# Patient Record
Sex: FEMALE | Race: WHITE | NOT HISPANIC OR LATINO | Employment: OTHER | URBAN - METROPOLITAN AREA
[De-identification: names, ages, dates, MRNs, and addresses within clinical notes are randomized per-mention and may not be internally consistent; named-entity substitution may affect disease eponyms.]

---

## 2020-11-12 ENCOUNTER — TRANSCRIBE ORDERS (OUTPATIENT)
Dept: ADMINISTRATIVE | Facility: HOSPITAL | Age: 80
End: 2020-11-12

## 2020-11-12 DIAGNOSIS — Z82.49 FAMILY HISTORY OF ABDOMINAL AORTIC ANEURYSM: Primary | ICD-10-CM

## 2020-11-19 ENCOUNTER — APPOINTMENT (OUTPATIENT)
Dept: ULTRASOUND IMAGING | Facility: HOSPITAL | Age: 80
End: 2020-11-19

## 2021-05-12 ENCOUNTER — OFFICE VISIT (OUTPATIENT)
Dept: NEUROLOGY | Facility: CLINIC | Age: 81
End: 2021-05-12

## 2021-05-12 VITALS
BODY MASS INDEX: 37.3 KG/M2 | HEIGHT: 60 IN | SYSTOLIC BLOOD PRESSURE: 124 MMHG | WEIGHT: 190 LBS | HEART RATE: 72 BPM | DIASTOLIC BLOOD PRESSURE: 80 MMHG | OXYGEN SATURATION: 90 % | TEMPERATURE: 96.9 F

## 2021-05-12 DIAGNOSIS — G47.19 EXCESSIVE DAYTIME SLEEPINESS: ICD-10-CM

## 2021-05-12 DIAGNOSIS — E53.8 VITAMIN B12 DEFICIENCY: ICD-10-CM

## 2021-05-12 DIAGNOSIS — R41.3 MEMORY LOSS: Primary | ICD-10-CM

## 2021-05-12 DIAGNOSIS — R29.6 FREQUENT FALLS: ICD-10-CM

## 2021-05-12 DIAGNOSIS — R41.0 DELIRIUM: ICD-10-CM

## 2021-05-12 PROBLEM — G89.29 CHRONIC BILATERAL LOW BACK PAIN WITH BILATERAL SCIATICA: Status: ACTIVE | Noted: 2021-05-12

## 2021-05-12 PROBLEM — E11.65 UNCONTROLLED TYPE 2 DIABETES MELLITUS WITH HYPERGLYCEMIA (HCC): Status: ACTIVE | Noted: 2021-05-12

## 2021-05-12 PROBLEM — I10 HYPERTENSION, BENIGN: Status: ACTIVE | Noted: 2021-05-12

## 2021-05-12 PROBLEM — Z79.4 TYPE 2 DIABETES MELLITUS WITH RETINOPATHY, WITH LONG-TERM CURRENT USE OF INSULIN (HCC): Status: ACTIVE | Noted: 2021-05-12

## 2021-05-12 PROBLEM — Z85.3 HISTORY OF BREAST CANCER: Status: ACTIVE | Noted: 2021-05-12

## 2021-05-12 PROBLEM — E11.319 TYPE 2 DIABETES MELLITUS WITH RETINOPATHY, WITH LONG-TERM CURRENT USE OF INSULIN (HCC): Status: ACTIVE | Noted: 2021-05-12

## 2021-05-12 PROBLEM — M54.42 CHRONIC BILATERAL LOW BACK PAIN WITH BILATERAL SCIATICA: Status: ACTIVE | Noted: 2021-05-12

## 2021-05-12 PROBLEM — M54.41 CHRONIC BILATERAL LOW BACK PAIN WITH BILATERAL SCIATICA: Status: ACTIVE | Noted: 2021-05-12

## 2021-05-12 PROBLEM — E78.2 MIXED HYPERLIPIDEMIA: Status: ACTIVE | Noted: 2021-05-12

## 2021-05-12 PROCEDURE — 99204 OFFICE O/P NEW MOD 45 MIN: CPT | Performed by: NURSE PRACTITIONER

## 2021-05-12 RX ORDER — PANTOPRAZOLE SODIUM 40 MG/1
TABLET, DELAYED RELEASE ORAL
COMMUNITY
Start: 2021-05-11

## 2021-05-12 RX ORDER — DIPHENHYDRAMINE HCL 25 MG
25 CAPSULE ORAL EVERY 6 HOURS PRN
COMMUNITY

## 2021-05-12 RX ORDER — ATORVASTATIN CALCIUM 80 MG/1
TABLET, FILM COATED ORAL
COMMUNITY
Start: 2021-05-11

## 2021-05-12 RX ORDER — BUPROPION HYDROCHLORIDE 150 MG/1
TABLET ORAL
COMMUNITY
Start: 2021-04-05

## 2021-05-12 RX ORDER — EXEMESTANE 25 MG/1
TABLET ORAL
COMMUNITY
Start: 2021-04-20 | End: 2021-11-19 | Stop reason: SDUPTHER

## 2021-05-12 RX ORDER — CLOPIDOGREL BISULFATE 75 MG/1
TABLET ORAL
COMMUNITY
Start: 2021-05-11 | End: 2021-11-19

## 2021-05-12 RX ORDER — ASPIRIN 81 MG/1
81 TABLET ORAL DAILY
COMMUNITY
End: 2021-11-19

## 2021-05-12 RX ORDER — SACUBITRIL AND VALSARTAN 24; 26 MG/1; MG/1
TABLET, FILM COATED ORAL
COMMUNITY
Start: 2021-05-11 | End: 2022-10-26

## 2021-05-12 RX ORDER — BLOOD SUGAR DIAGNOSTIC
STRIP MISCELLANEOUS
COMMUNITY
Start: 2021-05-08

## 2021-05-12 RX ORDER — METOPROLOL SUCCINATE 100 MG/1
TABLET, EXTENDED RELEASE ORAL
COMMUNITY
Start: 2021-05-11

## 2021-05-12 RX ORDER — INSULIN GLARGINE 300 U/ML
INJECTION, SOLUTION SUBCUTANEOUS
COMMUNITY
Start: 2021-04-05

## 2021-05-12 RX ORDER — EMPAGLIFLOZIN 10 MG/1
TABLET, FILM COATED ORAL
COMMUNITY
Start: 2021-05-08

## 2021-05-12 NOTE — PROGRESS NOTES
Subjective:     Patient ID: Urvashi Barrera is a 80 y.o. female.    CC:   Chief Complaint   Patient presents with   • Memory Loss       HPI:   History of Present Illness     Urvashi Barrera is a 80 y.o. female who comes to clinic today for evaluation of cognitive impairment. She has noted symptoms since at least Fall 2020 marked initially by forgetfulness , repetitiveness  and word-finding difficulties . This has gradually worsened  over time. Additional symptoms have included impairments in orientation , language  and short term memory. There have been associated  symptoms of anxiety , depression  and confusion intermittently with multiple hospitalizations. She denies  impairments in ADL's. Her family manages her medications and finances. She is no longer driving . She is currently residing at home with daughter and daughter's partner.     Moved from [place] where she was living in senior living alone. After multiple falls and an extensive hospitalization with poor diabetes control & rehab, she eventually was moved up to [place], [place] to live with daughter around October 2020. She was then diagnosed with an MI at [facility] 11/17/2021. She had rehab after this with PT/OT and had delirium. UTIs have been ruled out multiple times. She also had a Fall around Feb. 8th, 2021 and went to  ER and had Ct head and was reportedly normal. She was also admitted to  March 2021 for pancreatitis with gallstones and stent placement which has since been removed. Daughter feels like depression and anxiety have been increased and recently started Wellbutrin XL 150mg daily which has helped with anxiety, depression and less confusion. Has CKD Stage 3. Her daughter has noticed occasional hallucinations or delusions but this has typically been during illness. She completed high school and 1 year of college education. 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 She uses walker for ambulation. Has  some tremors in both hands present for several years and not resting or pill rolling. She does not sleep well and has excessive daytime sleepiness. Her daughter is concerned about Dementia. Has poor vision with left retinal tear and cataract surgery bilaterally. Wears glasses.    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 CTA head and neck 2/2021 showed no significant plaque. She is on aspirin 81mg daily, Plavix 75mg daily and Lipitor 80mg at night.    The following portions of the patient's history were reviewed and updated as appropriate: allergies, current medications, past family history, past medical history, past social history, past surgical history and problem list.    Past Medical History:   Diagnosis Date   • Anxiety    • Arthritis    • Cancer (CMS/HCC)    • Depression    • Diabetes mellitus (CMS/HCC)    • Heart disease    • Hyperlipidemia    • Hypertension        Past Surgical History:   Procedure Laterality Date   • BREAST LUMPECTOMY     • CATARACT EXTRACTION     • CORONARY STENT PLACEMENT     • GALLBLADDER SURGERY      placed and removed stent   • HYSTERECTOMY         Social History     Socioeconomic History   • Marital status: Unknown     Spouse name: Not on file   • Number of children: Not on file   • Years of education: Not on file   • Highest education level: Not on file   Tobacco Use   • Smoking status: Never Smoker   • Smokeless tobacco: Never Used   Vaping Use   • Vaping Use: Never used   Substance and Sexual Activity   • Alcohol use: Not Currently   • Drug use: Never   • Sexual activity: Not Currently       Family History   Problem Relation Age of Onset   • Hypertension Mother    • Stroke Mother    • Heart disease Father    • Hypertension Father         Review of Systems   Constitutional: Negative for chills, fatigue, fever and unexpected weight change.   HENT:  "Negative for ear pain, hearing loss, nosebleeds, rhinorrhea and sore throat.    Eyes: Negative for photophobia, pain, discharge, itching and visual disturbance.   Respiratory: Negative for cough, chest tightness, shortness of breath and wheezing.    Cardiovascular: Negative for chest pain, palpitations and leg swelling.   Gastrointestinal: Negative for abdominal pain, blood in stool, constipation, diarrhea, nausea and vomiting.   Genitourinary: Negative for dysuria, frequency, hematuria and urgency.   Musculoskeletal: Negative for arthralgias, back pain, gait problem, joint swelling, myalgias, neck pain and neck stiffness.   Skin: Negative for rash and wound.   Allergic/Immunologic: Negative for environmental allergies and food allergies.   Neurological: Negative for dizziness, tremors, seizures, syncope, speech difficulty, weakness, light-headedness, numbness and headaches.   Hematological: Negative for adenopathy. Does not bruise/bleed easily.   Psychiatric/Behavioral: Positive for agitation, confusion and decreased concentration. Negative for hallucinations, sleep disturbance and suicidal ideas. The patient is nervous/anxious.    All other systems reviewed and are negative.       Objective:  /80   Pulse 72   Temp 96.9 °F (36.1 °C)   Ht 152.4 cm (60\")   Wt 86.2 kg (190 lb)   SpO2 90%   BMI 37.11 kg/m²     Neurologic Exam     Mental Status   Oriented to person, place, and time.   Registration: recalls 3 of 3 objects. Recall at 5 minutes: recalls 3 of 3 objects. Follows 3 step commands.   Attention: normal. Concentration: normal.   Speech: speech is normal   Level of consciousness: alert  Knowledge: good and consistent with education. Able to perform simple calculations.   Able to name object. Able to read. Able to repeat. Able to write. Normal comprehension.     Cranial Nerves     CN II   Visual acuity: decreased    CN III, IV, VI   Pupils are equal, round, and reactive to light.  Extraocular motions " are normal.     CN V   Facial sensation intact.     CN VII   Facial expression full, symmetric.     CN VIII   CN VIII normal.     CN IX, X   CN IX normal.   CN X normal.     CN XI   CN XI normal.     CN XII   CN XII normal.     Motor Exam   Muscle bulk: normal  Overall muscle tone: normal    Strength   Strength 5/5 throughout.     Gait, Coordination, and Reflexes     Gait  Gait: wide-based (using walker, brisk gait)    Coordination   Romberg: negative  Finger to nose coordination: normal  Heel to shin coordination: normal    Tremor   Resting tremor: absent  Intention tremor: present (mild BUE kinetic hand tremor noted)  Action tremor: absent    Reflexes   Right brachioradialis: 2+  Left brachioradialis: 2+  Right biceps: 2+  Left biceps: 2+  Right patellar: 1+  Left patellar: 1+  Right achilles: 1+  Left achilles: 1+  Right : 2+  Left : 2+  Right plantar: normal  Left plantar: normal  Right ankle clonus: absent  Left ankle clonus: absent  Normal finger taps bilaterally       Physical Exam  Constitutional:       Appearance: Normal appearance.   Eyes:      Extraocular Movements: EOM normal.      Pupils: Pupils are equal, round, and reactive to light.   Cardiovascular:      Rate and Rhythm: Normal rate and regular rhythm.      Heart sounds: Normal heart sounds, S1 normal and S2 normal.   Pulmonary:      Effort: Pulmonary effort is normal.      Breath sounds: Normal breath sounds.   Neurological:      Mental Status: She is alert and oriented to person, place, and time.      Coordination: Finger-Nose-Finger Test, Heel to Shin Test and Romberg Test normal.      Deep Tendon Reflexes: Strength normal.      Reflex Scores:       Bicep reflexes are 2+ on the right side and 2+ on the left side.       Brachioradialis reflexes are 2+ on the right side and 2+ on the left side.       Patellar reflexes are 1+ on the right side and 1+ on the left side.       Achilles reflexes are 1+ on the right side and 1+ on the left  side.  Psychiatric:         Mood and Affect: Mood is anxious.         Speech: Speech normal.         Behavior: Behavior normal.         Thought Content: Thought content normal.         Cognition and Memory: She exhibits impaired recent memory (reported).         Judgment: Judgment normal.     MMSE 30/30 today    Assessment/Plan:       Diagnoses and all orders for this visit:    1. Memory loss (Primary)  Comments:  consider SLP-daughter will let us know.   Orders:  -     TSH; Future  -     T4, free; Future  -     Vitamin B12 & Folate; Future  -     Methylmalonic Acid, Serum; Future  -     Comprehensive Metabolic Panel; Future  -     CBC & Differential; Future  -     EEG Awake or Drowsy Routine; Future  -     T4, free  -     CBC & Differential  -     Comprehensive Metabolic Panel  -     Methylmalonic Acid, Serum  -     Vitamin B12 & Folate  -     TSH    2. Delirium  -     EEG Awake or Drowsy Routine; Future    3. Excessive daytime sleepiness  -     Ambulatory Referral to Sleep Medicine    4. Vitamin B12 deficiency  -     Vitamin B12 & Folate; Future  -     Methylmalonic Acid, Serum; Future  -     Methylmalonic Acid, Serum  -     Vitamin B12 & Folate    5. Frequent falls  Comments:  recently completed PT/OT. consider restarting.           We have discussed concerns of episodic delirium associated with hospitalizations and illnesses.  Also possible mild cognitive impairment however MMSE is a 30 out of 30 today.  I do suspect that anxiety and depression are playing a role as well as her adjusting to moving from [place] to [place].  She does have extensive chronic small vessel ischemic changes on the brain with extensive and advanced atrophy.  Also has noted old strokes.  She is currently on aspirin, Plavix and statin therapy.  Recommend working with PCP to get control of diabetes with recent hemoglobin A1c 7.6%.  She also has a known vitamin B12 deficiency from her prior home and was supposed to be on B12  injections.  We will check her labs today and order accordingly with PCP.  She may have some mild cognitive impairment.  We will continue to monitor her memory.  I did offer an MRI of the brain but they would like to wait with CT of the head done recently.  I also offered PT, OT and speech language pathology services and they will let us know if they are interested.  Also gave them information on memory care clinic and  resources.  Falls prevention discussed.  She is improving even in just 1 month on the Wellbutrin which I will feel she should continue for at least 3 months total with a 2 month follow-up in our clinic to see how she is doing.  Could consider cognitive enhancer at that time based on her symptoms.  We will monitor the tremor and episodic hallucinations and delusions which have been associated with illness and delirium.  With fluctuations we are going to get a baseline EEG.  We will also refer her to sleep medicine for sleep apnea evaluation.    Reviewed medications, potential side effects and signs and symptoms to report. Discussed risk versus benefits of treatment plan with patient and/or family-including medications, labs and radiology that may be ordered. Addressed questions and concerns during visit. Patient and/or family verbalized understanding and agree with plan.    AS THE PROVIDER, I PERSONALLY WORE PPE DURING ENTIRE FACE TO FACE ENCOUNTER IN CLINIC WITH THE PATIENT. PATIENT ALSO WORE PPE DURING ENTIRE FACE TO FACE ENCOUNTER EXCEPT FOR A MAX OF 30 SECONDS DURING NEUROLOGICAL EVALUATION OF CRANIAL NERVES AND THEN MASK WAS PLACED BACK OVER PATIENT FACE FOR REMAINDER OF VISIT. I WASHED MY HANDS BEFORE AND AFTER VISIT.    During this visit the following were done:  Labs Reviewed [x]  [facility] portal labs reviewed on daughter's cell phone today  Labs Ordered [x]    Radiology Reports Reviewed []    Radiology Ordered []    PCP Records Reviewed [x]    Referring Provider Records Reviewed [x]     ER Records Reviewed []    Hospital Records Reviewed [x]  [facility]ed [facility] and ct scans/cta scans on daughter's phone today from [facility]  History Obtained From Family [x]    Radiology Images Reviewed [x]    Other Reviewed []    Records Requested []      Shirley Billingsley, APRN  5/12/2021

## 2021-05-13 ENCOUNTER — TELEPHONE (OUTPATIENT)
Dept: NEUROLOGY | Facility: CLINIC | Age: 81
End: 2021-05-13

## 2021-05-13 DIAGNOSIS — R41.3 MEMORY LOSS: Primary | ICD-10-CM

## 2021-05-13 DIAGNOSIS — R29.6 FREQUENT FALLS: ICD-10-CM

## 2021-05-13 DIAGNOSIS — Z74.09 IMPAIRED FUNCTIONAL MOBILITY, BALANCE, GAIT, AND ENDURANCE: ICD-10-CM

## 2021-05-13 NOTE — TELEPHONE ENCOUNTER
Placed PT/OT/SLP orders with [facility]. Hopefully insurance will approve. Sent for [facility] or hospital based on their preference. We will continue to monitor cognition. I think a lot is related to her diabetes and frequent falls and hospitalizations. She felt better on wellbutrin xl and we may consider increasing this next visit. Thank you for this information. Please let daughter know I ordered these. Thanks, Shirley

## 2021-05-13 NOTE — TELEPHONE ENCOUNTER
"Phone call in from daughter, Gail, returning my call. She was glad I called as she wanted to provide updated information she couldn't discuss in visit. She said patient has had noticeable changes in home cleanliness, motivation issues since before moving to [place], stating anxiety/worry is baseline for her. She has improved since being with daughter possibly due to having diabetes better managed and around people but still very noticeable, also notes short term memory, very repetitious. She has a new tablet with games but will not initiate this. Says she feels like she has no purpose since daughter is grown, son . She has been short tempered \"lashing out\" at times, not sleeping well at night, sleeps too late and sleeps during day. We discussed ways to get her engaged thinking a /caregiver would be helpful. She has completed most parts of the Medicaid Waiver but needing assitance finding  for coordinating caregivers. I got her info on [facility] who can assist. We will talk with Shirley about restarting OT/PT and even SLP cog rehab. We will keep in touch as needed.    "

## 2021-05-14 NOTE — TELEPHONE ENCOUNTER
Daughter said she would like therapy through a group they have had previously, [facility]. Thank you!

## 2021-05-14 NOTE — TELEPHONE ENCOUNTER
I CALLED [facility] IN [place] AND SPOKE WITH THAO WHO REQUESTED I FAX THE LAST OFFICE NOTE AND REFERRAL AND THEY WILL CALL TO SCHEDULE.  FAXED

## 2021-05-18 ENCOUNTER — TELEPHONE (OUTPATIENT)
Dept: NEUROLOGY | Facility: CLINIC | Age: 81
End: 2021-05-18

## 2021-05-18 LAB
ALBUMIN SERPL-MCNC: 4.1 G/DL (ref 3.5–5.2)
ALBUMIN/GLOB SERPL: 1.6 G/DL
ALP SERPL-CCNC: 117 U/L (ref 39–117)
ALT SERPL-CCNC: 14 U/L (ref 1–33)
AST SERPL-CCNC: 17 U/L (ref 1–32)
BASOPHILS # BLD AUTO: 0.01 10*3/MM3 (ref 0–0.2)
BASOPHILS NFR BLD AUTO: 0.2 % (ref 0–1.5)
BILIRUB SERPL-MCNC: 0.4 MG/DL (ref 0–1.2)
BUN SERPL-MCNC: 24 MG/DL (ref 8–23)
BUN/CREAT SERPL: 20.7 (ref 7–25)
CALCIUM SERPL-MCNC: 9.8 MG/DL (ref 8.6–10.5)
CHLORIDE SERPL-SCNC: 104 MMOL/L (ref 98–107)
CO2 SERPL-SCNC: 23.6 MMOL/L (ref 22–29)
CREAT SERPL-MCNC: 1.16 MG/DL (ref 0.57–1)
EOSINOPHIL # BLD AUTO: 0.14 10*3/MM3 (ref 0–0.4)
EOSINOPHIL NFR BLD AUTO: 2.6 % (ref 0.3–6.2)
ERYTHROCYTE [DISTWIDTH] IN BLOOD BY AUTOMATED COUNT: 13.3 % (ref 12.3–15.4)
FOLATE SERPL-MCNC: 16.2 NG/ML (ref 4.78–24.2)
GLOBULIN SER CALC-MCNC: 2.5 GM/DL
GLUCOSE SERPL-MCNC: 164 MG/DL (ref 65–99)
HCT VFR BLD AUTO: 40.6 % (ref 34–46.6)
HGB BLD-MCNC: 13.2 G/DL (ref 12–15.9)
IMM GRANULOCYTES # BLD AUTO: 0.01 10*3/MM3 (ref 0–0.05)
IMM GRANULOCYTES NFR BLD AUTO: 0.2 % (ref 0–0.5)
LYMPHOCYTES # BLD AUTO: 1.52 10*3/MM3 (ref 0.7–3.1)
LYMPHOCYTES NFR BLD AUTO: 28.7 % (ref 19.6–45.3)
Lab: ABNORMAL
MCH RBC QN AUTO: 31.1 PG (ref 26.6–33)
MCHC RBC AUTO-ENTMCNC: 32.5 G/DL (ref 31.5–35.7)
MCV RBC AUTO: 95.8 FL (ref 79–97)
METHYLMALONATE SERPL-SCNC: 539 NMOL/L (ref 0–378)
MONOCYTES # BLD AUTO: 0.48 10*3/MM3 (ref 0.1–0.9)
MONOCYTES NFR BLD AUTO: 9.1 % (ref 5–12)
NEUTROPHILS # BLD AUTO: 3.14 10*3/MM3 (ref 1.7–7)
NEUTROPHILS NFR BLD AUTO: 59.2 % (ref 42.7–76)
NRBC BLD AUTO-RTO: 0 /100 WBC (ref 0–0.2)
PLATELET # BLD AUTO: 199 10*3/MM3 (ref 140–450)
POTASSIUM SERPL-SCNC: 3.9 MMOL/L (ref 3.5–5.2)
PROT SERPL-MCNC: 6.6 G/DL (ref 6–8.5)
RBC # BLD AUTO: 4.24 10*6/MM3 (ref 3.77–5.28)
SODIUM SERPL-SCNC: 141 MMOL/L (ref 136–145)
T4 FREE SERPL-MCNC: 1.18 NG/DL (ref 0.93–1.7)
TSH SERPL DL<=0.005 MIU/L-ACNC: 2.06 UIU/ML (ref 0.27–4.2)
VIT B12 SERPL-MCNC: 284 PG/ML (ref 211–946)
WBC # BLD AUTO: 5.3 10*3/MM3 (ref 3.4–10.8)

## 2021-05-18 NOTE — TELEPHONE ENCOUNTER
Provider: GERMAN ESPINOZA    Caller: BUDDY RAZA    Relationship to Patient: DAUGHTER    Pharmacy: NA    Phone Number: [phone]    Reason for Call: PATIENTS DAUGHTER IS ASKING FOR A CALL TO GO OVER LAB RESULTS. DAUGHTER ALSO SAID THAT DURING LAST VISIT, GERMAN READY THE CT SCANS AND MENTIONED A STROKE, SHE IS ASKING FOR AN MD TO READ THOSE RESULTS AS WELL AND GET BACK TO THEM ON THE RESULTS. PLEASE ADVISE.     When was the patient last seen: 5-12-21

## 2021-05-19 ENCOUNTER — TELEPHONE (OUTPATIENT)
Dept: NEUROLOGY | Facility: CLINIC | Age: 81
End: 2021-05-19

## 2021-05-19 DIAGNOSIS — E53.8 VITAMIN B12 DEFICIENCY: Primary | ICD-10-CM

## 2021-05-19 NOTE — TELEPHONE ENCOUNTER
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 CTA head and neck 2/2021 showed no significant plaque. As far as the scans go, an MD did read the scans-these scans were obtained from  records sent by the PCP and the Radiologist at  is always an MD and is the one who read the scans. I did not actually read the scans but simply restated what the MD Radiologist who viewed all of her CT head scans reported as his findings. I hope this gives her reassurance.    The labs were just resulted on 5/18/21 when I was out of the office. Blood counts and thyroid function look excellent. Liver function is normal. Her renal function which is the creatinine and BUN are slightly elevated which is very common in those over 65 years old. This is something to monitor closely with the PCP over time. Things that can worsen this include dehydration or not drinking enough fluids, some medications and ibuprofen.     Her vitamin B12 level is low and the more definitive test to check for a true deficiency is MMA or methylmalonic acid which is elevated indicating the patient has a true vitamin b12 deficiency. This can also negatively affect memory. I would like PCP office to be notified to set up vitamin b12 injections 1000mcg IM weekly x 4 weeks then monthly x 4 months and recheck B12 to ensure this is coming up. Sometimes after injections, patient can be transitioned to oral B12 replacement.     If she has any other questions. Please let me know. Thanks, LACY Zuleta

## 2021-05-19 NOTE — TELEPHONE ENCOUNTER
----- Message from Shannan Harden sent at 5/19/2021  9:34 AM EDT -----  [facility] called to inform SHAE Billingsley they have starteded pt on patient yesterday.  They will also do the other  speech therapy etc this week.  They will fax appropriate paperwork etc when prepared.

## 2021-05-19 NOTE — TELEPHONE ENCOUNTER
Ok. Noted. Please make sure you call daughter with my response below about ct scan and labs and fax and call pcp about vitamin b12 shots being recommended. Thank you.

## 2021-05-19 NOTE — TELEPHONE ENCOUNTER
PT AWARE OF THE RESULTS AND FAXED THE RESULTS TO PCP AND REQUESTED PCP CONTACT PT TO SCHEDULE PT B-12 INJECTIONS.

## 2021-05-19 NOTE — TELEPHONE ENCOUNTER
----- Message from LACY Carver sent at 5/19/2021  7:55 AM EDT -----  The labs were just resulted on 5/18/21 when I was out of the office. Blood counts and thyroid function look excellent. Liver function is normal. Her renal function which is the creatinine and BUN are slightly elevated which is very common in those over 65 years old. This is something to monitor closely with the PCP over time. Things that can worsen this include dehydration or not drinking enough fluids, some medications and ibuprofen.     Her vitamin B12 level is low and the more definitive test to check for a true deficiency is MMA or methylmalonic acid which is elevated indicating the patient has a true vitamin b12 deficiency. This can also negatively affect memory. I would like PCP office to be notified to set up vitamin b12 injections 1000mcg IM weekly x 4 weeks then monthly x 4 months and recheck B12 to ensure this is coming up. Sometimes after injections, patient can be transitioned to oral B12 replacement.     Please fax labs to PCP AND CALL them to see if they can start B12 injections as recommended above.    If she has any other questions. Please let me know. Thanks, LACY Zuleta

## 2021-05-19 NOTE — TELEPHONE ENCOUNTER
Note-For some reason this did not initially connect to the message sent below-this is a duplicate of what I have already sent. Please only call daughter once:    CT Head 2/2021 at  per radiology report shows extensive and advanced chronic microvascular ischemic infarcts with advanced atrophy and no acute intracranial abnormalities. CTA head and neck 2/2021 showed no significant plaque. As far as the scans go, an MD did read the scans-these scans were obtained from  records sent by the PCP and the Radiologist at  is always an MD and is the one who read the scans. I did not actually read the scans but simply restated what the MD Radiologist who viewed all of her CT head scans reported as his findings. I hope this gives her reassurance.     The labs were just resulted on 5/18/21 when I was out of the office. Blood counts and thyroid function look excellent. Liver function is normal. Her renal function which is the creatinine and BUN are slightly elevated which is very common in those over 65 years old. This is something to monitor closely with the PCP over time. Things that can worsen this include dehydration or not drinking enough fluids, some medications and ibuprofen.      Her vitamin B12 level is low and the more definitive test to check for a true deficiency is MMA or methylmalonic acid which is elevated indicating the patient has a true vitamin b12 deficiency. This can also negatively affect memory. I would like PCP office to be notified to set up vitamin b12 injections 1000mcg IM weekly x 4 weeks then monthly x 4 months and recheck B12 to ensure this is coming up. Sometimes after injections, patient can be transitioned to oral B12 replacement.      If she has any other questions. Please let me know. Thanks, LACY Zuleta

## 2021-05-26 RX ORDER — CYANOCOBALAMIN 1000 UG/ML
INJECTION, SOLUTION INTRAMUSCULAR; SUBCUTANEOUS
Qty: 10 ML | Refills: 0 | Status: SHIPPED | OUTPATIENT
Start: 2021-05-26 | End: 2021-11-19

## 2021-05-27 ENCOUNTER — PRIOR AUTHORIZATION (OUTPATIENT)
Dept: NEUROLOGY | Facility: CLINIC | Age: 81
End: 2021-05-27

## 2021-05-27 NOTE — TELEPHONE ENCOUNTER
PA FOR CYANOCOBALAMIN INJECTIONS THROUGH COVER MY MEDS, KEY IS RAHUL, IT WAS DENIED.  WAITING ON DENIAL LETTER TO APPEAL.

## 2021-06-04 ENCOUNTER — TELEPHONE (OUTPATIENT)
Dept: NEUROLOGY | Facility: CLINIC | Age: 81
End: 2021-06-04

## 2021-06-04 NOTE — TELEPHONE ENCOUNTER
ISAMAR FROM [facility] CALLED IN AND STATED THAT THE PATIENTS SWALLOWING WAS IN NORMAL RANGE. I EXPLAINED THAT THE REFERRAL WAS FOR COGNATIVE EVAL. SO I ENDED UP GIVING HER A VERBAL ORDER FOR HER TO GO BACK OUT TO THE PATIENT. THE DAUGHTER HAD TOLD HER ABOUT THE PATIENT HAVING TROUBLE SWALLOWING.

## 2021-06-04 NOTE — TELEPHONE ENCOUNTER
Unfortunately medicare part D refuses to approve vitamin B12 injections. Unfortunately there is no way to get these approved. I would recommend her daughter obtain over the counter vitamin b12 500mcg to 1000mcg daily in liquid or oral form to give her. Thanks, LACY Zuleta

## 2021-06-11 ENCOUNTER — TELEPHONE (OUTPATIENT)
Dept: NEUROLOGY | Facility: CLINIC | Age: 81
End: 2021-06-11

## 2021-06-11 NOTE — TELEPHONE ENCOUNTER
[facility], Lety Stephenson, called and said pt missed her visit graeme 6- due to feeling ill and she wanted to get a verbal to reschedule pt for future visits and I gave the verbal as well.

## 2021-06-14 ENCOUNTER — TELEPHONE (OUTPATIENT)
Dept: NEUROLOGY | Facility: CLINIC | Age: 81
End: 2021-06-14

## 2021-06-14 NOTE — TELEPHONE ENCOUNTER
----- Message from Shannan Harden sent at 6/14/2021 10:18 AM EDT -----  Regarding: PHYSICAL THERAPY  CHUY NEWSOME, PHYSICAL THERAPIST, CALLED.  SHE IS WANTING A VERBAL FOR ADDITIONAL PHYSICAL THERAPY FOR PATIENT.  SHE IS HAVING WORSENING PAIN WITH HER SCIATICA WHICH HAS WORSENED THIS PAST WEEK.  HER PHONE -[phone].

## 2021-06-16 NOTE — TELEPHONE ENCOUNTER
Reina called in and stated that she would like to see the patient this week she is having issues with her sciatica and some falls. I gave her a verbal for PT

## 2021-06-23 ENCOUNTER — TRANSCRIBE ORDERS (OUTPATIENT)
Dept: ADMINISTRATIVE | Facility: HOSPITAL | Age: 81
End: 2021-06-23

## 2021-06-23 DIAGNOSIS — M54.42 LOW BACK PAIN WITH LEFT-SIDED SCIATICA, UNSPECIFIED BACK PAIN LATERALITY, UNSPECIFIED CHRONICITY: Primary | ICD-10-CM

## 2021-07-01 ENCOUNTER — TELEMEDICINE (OUTPATIENT)
Dept: SLEEP MEDICINE | Facility: HOSPITAL | Age: 81
End: 2021-07-01

## 2021-07-01 DIAGNOSIS — R06.83 SNORING: Primary | ICD-10-CM

## 2021-07-01 DIAGNOSIS — G47.61 PLMD (PERIODIC LIMB MOVEMENT DISORDER): ICD-10-CM

## 2021-07-01 DIAGNOSIS — G47.33 OBSTRUCTIVE SLEEP APNEA, ADULT: ICD-10-CM

## 2021-07-01 DIAGNOSIS — G47.21 CIRCADIAN RHYTHM SLEEP DISORDER, DELAYED SLEEP PHASE TYPE: ICD-10-CM

## 2021-07-01 DIAGNOSIS — E66.01 CLASS 2 SEVERE OBESITY DUE TO EXCESS CALORIES WITH SERIOUS COMORBIDITY AND BODY MASS INDEX (BMI) OF 35.0 TO 35.9 IN ADULT (HCC): ICD-10-CM

## 2021-07-01 PROCEDURE — 99203 OFFICE O/P NEW LOW 30 MIN: CPT | Performed by: INTERNAL MEDICINE

## 2021-07-01 NOTE — PATIENT INSTRUCTIONS
Restless Legs Syndrome  Restless legs syndrome is a condition that causes uncomfortable feelings or sensations in the legs, especially while sitting or lying down. The sensations usually cause an overwhelming urge to move the legs. The arms can also sometimes be affected.  The condition can range from mild to severe. The symptoms often interfere with a person's ability to sleep.  What are the causes?  The cause of this condition is not known.  What increases the risk?  The following factors may make you more likely to develop this condition:  · Being older than 50.  · Pregnancy.  · Being a woman. In general, the condition is more common in women than in men.  · A family history of the condition.  · Having iron deficiency.  · Overuse of caffeine, nicotine, or alcohol.  · Certain medical conditions, such as kidney disease, Parkinson's disease, or nerve damage.  · Certain medicines, such as those for high blood pressure, nausea, colds, allergies, depression, and some heart conditions.  What are the signs or symptoms?  The main symptom of this condition is uncomfortable sensations in the legs, such as:  · Pulling.  · Tingling.  · Prickling.  · Throbbing.  · Crawling.  · Burning.  Usually, the sensations:  · Affect both sides of the body.  · Are worse when you sit or lie down.  · Are worse at night. These may wake you up or make it difficult to fall asleep.  · Make you have a strong urge to move your legs.  · Are temporarily relieved by moving your legs.  The arms can also be affected, but this is rare. People who have this condition often have tiredness during the day because of their lack of sleep at night.  How is this diagnosed?  This condition may be diagnosed based on:  · Your symptoms.  · Blood tests.  In some cases, you may be monitored in a sleep lab by a specialist (a sleep study). This can detect any disruptions in your sleep.  How is this treated?  This condition is treated by managing the symptoms. This may  include:  · Lifestyle changes, such as exercising, using relaxation techniques, and avoiding caffeine, alcohol, or tobacco.  · Medicines. Anti-seizure medicines may be tried first.  Follow these instructions at home:  General instructions  · Take over-the-counter and prescription medicines only as told by your health care provider.  · Use methods to help relieve the uncomfortable sensations, such as:  ? Massaging your legs.  ? Walking or stretching.  ? Taking a cold or hot bath.  · Keep all follow-up visits as told by your health care provider. This is important.  Lifestyle         · Practice good sleep habits. For example, go to bed and get up at the same time every day. Most adults should get 7-9 hours of sleep each night.  · Exercise regularly. Try to get at least 30 minutes of exercise most days of the week.  · Practice ways of relaxing, such as yoga or meditation.  · Avoid caffeine and alcohol.  · Do not use any products that contain nicotine or tobacco, such as cigarettes and e-cigarettes. If you need help quitting, ask your health care provider.  Contact a health care provider if:  · Your symptoms get worse or they do not improve with treatment.  Summary  · Restless legs syndrome is a condition that causes uncomfortable feelings or sensations in the legs, especially while sitting or lying down.  · The symptoms often interfere with a person's ability to sleep.  · This condition is treated by managing the symptoms. You may need to make lifestyle changes or take medicines.  This information is not intended to replace advice given to you by your health care provider. Make sure you discuss any questions you have with your health care provider.  Document Revised: 02/05/2021 Document Reviewed: 01/07/2019  Elsevier Patient Education © 2021 [employer]  Sleep Apnea  Sleep apnea is a condition in which breathing pauses or becomes shallow during sleep. Episodes of sleep apnea usually last 10 seconds or longer, and  they may occur as many as 20 times an hour. Sleep apnea disrupts your sleep and keeps your body from getting the rest that it needs. This condition can increase your risk of certain health problems, including:  · Heart attack.  · Stroke.  · Obesity.  · Diabetes.  · Heart failure.  · Irregular heartbeat.  What are the causes?  There are three kinds of sleep apnea:  · Obstructive sleep apnea. This kind is caused by a blocked or collapsed airway.  · Central sleep apnea. This kind happens when the part of the brain that controls breathing does not send the correct signals to the muscles that control breathing.  · Mixed sleep apnea. This is a combination of obstructive and central sleep apnea.  The most common cause of this condition is a collapsed or blocked airway. An airway can collapse or become blocked if:  · Your throat muscles are abnormally relaxed.  · Your tongue and tonsils are larger than normal.  · You are overweight.  · Your airway is smaller than normal.  What increases the risk?  You are more likely to develop this condition if you:  · Are overweight.  · Smoke.  · Have a smaller than normal airway.  · Are elderly.  · Are male.  · Drink alcohol.  · Take sedatives or tranquilizers.  · Have a family history of sleep apnea.  What are the signs or symptoms?  Symptoms of this condition include:  · Trouble staying asleep.  · Daytime sleepiness and tiredness.  · Irritability.  · Loud snoring.  · Morning headaches.  · Trouble concentrating.  · Forgetfulness.  · Decreased interest in sex.  · Unexplained sleepiness.  · Mood swings.  · Personality changes.  · Feelings of depression.  · Waking up often during the night to urinate.  · Dry mouth.  · Sore throat.  How is this diagnosed?  This condition may be diagnosed with:  · A medical history.  · A physical exam.  · A series of tests that are done while you are sleeping (sleep study). These tests are usually done in a sleep lab, but they may also be done at home.  How  is this treated?  Treatment for this condition aims to restore normal breathing and to ease symptoms during sleep. It may involve managing health issues that can affect breathing, such as high blood pressure or obesity. Treatment may include:  · Sleeping on your side.  · Using a decongestant if you have nasal congestion.  · Avoiding the use of depressants, including alcohol, sedatives, and narcotics.  · Losing weight if you are overweight.  · Making changes to your diet.  · Quitting smoking.  · Using a device to open your airway while you sleep, such as:  ? An oral appliance. This is a custom-made mouthpiece that shifts your lower jaw forward.  ? A continuous positive airway pressure (CPAP) device. This device blows air through a mask when you breathe out (exhale).  ? A nasal expiratory positive airway pressure (EPAP) device. This device has valves that you put into each nostril.  ? A bi-level positive airway pressure (BPAP) device. This device blows air through a mask when you breathe in (inhale) and breathe out (exhale).  · Having surgery if other treatments do not work. During surgery, excess tissue is removed to create a wider airway.  It is important to get treatment for sleep apnea. Without treatment, this condition can lead to:  · High blood pressure.  · Coronary artery disease.  · In men, an inability to achieve or maintain an erection (impotence).  · Reduced thinking abilities.  Follow these instructions at home:  Lifestyle  · Make any lifestyle changes that your health care provider recommends.  · Eat a healthy, well-balanced diet.  · Take steps to lose weight if you are overweight.  · Avoid using depressants, including alcohol, sedatives, and narcotics.  · Do not use any products that contain nicotine or tobacco, such as cigarettes, e-cigarettes, and chewing tobacco. If you need help quitting, ask your health care provider.  General instructions  · Take over-the-counter and prescription medicines only as  told by your health care provider.  · If you were given a device to open your airway while you sleep, use it only as told by your health care provider.  · If you are having surgery, make sure to tell your health care provider you have sleep apnea. You may need to bring your device with you.  · Keep all follow-up visits as told by your health care provider. This is important.  Contact a health care provider if:  · The device that you received to open your airway during sleep is uncomfortable or does not seem to be working.  · Your symptoms do not improve.  · Your symptoms get worse.  Get help right away if:  · You develop:  ? Chest pain.  ? Shortness of breath.  ? Discomfort in your back, arms, or stomach.  · You have:  ? Trouble speaking.  ? Weakness on one side of your body.  ? Drooping in your face.  These symptoms may represent a serious problem that is an emergency. Do not wait to see if the symptoms will go away. Get medical help right away. Call your local emergency services (911 in the [place] Do not drive yourself to the hospital.  Summary  · Sleep apnea is a condition in which breathing pauses or becomes shallow during sleep.  · The most common cause is a collapsed or blocked airway.  · The goal of treatment is to restore normal breathing and to ease symptoms during sleep.  This information is not intended to replace advice given to you by your health care provider. Make sure you discuss any questions you have with your health care provider.  Document Revised: 06/03/2020 Document Reviewed: 08/13/2019  [employer] Patient Education © 2021 [employer].

## 2021-07-02 NOTE — PROGRESS NOTES
"Subjective   Urvashi Barrera is a 80 y.o. female is being seen for consultation today at the request of ROVERTO Carver for the evaluation of excessive sleepiness.  Patient's primary care provider is LACY Skelton  You have chosen to receive care through a telehealth visit.  Do you consent to use a video/audio connection for your medical care today? Yes    History of Present Illness  When initially questioned the patient says she \"sleeps okay\".  On coaxing from her caregiver and her daughter she apparently has difficulty sleeping at night and then sleeps a lot during the day.  She admits she has had snoring noted all of her life.  She apparently has been noted to have some apneas recently.  She denies awakening gasping for breath.  She admits she is not rested on arising in the morning.  She denies morning headaches.    She will awaken with a dry mouth or sore throat.  She denies ever breaking her nose.  She has awakened due to leg kicks.  She is currently on gabapentin for back pain.  She has a history of coronary artery disease and had an MI in October.  She had breast surgery for cancer in 2005.    She apparently gets in bed at 9 and 10 PM but watches TV and then does not usually go to sleep until about 2 AM.  She will awaken 2-3 times during the night.  She often sleeps till 930 or 10 in the morning.  She gets about 8 hours of sleep but is not rested.  She has had hypertension known for about 4 years and diabetes known for 4 years.  She has been known to have coronary artery disease only since last fall.  She has had problems recently with gallstone pancreatitis and had to have it by biliary stents.  She continues to complain of a lot of back pain.  No Known Allergies       Current Outpatient Medications:   •  Accu-Chek Brit Plus test strip, , Disp: , Rfl:   •  aspirin (aspirin) 81 MG EC tablet, Take 81 mg by mouth Daily., Disp: , Rfl:   •  atorvastatin (LIPITOR) 80 MG tablet, , Disp: , Rfl:   •  " "buPROPion XL (WELLBUTRIN XL) 150 MG 24 hr tablet, , Disp: , Rfl:   •  clopidogrel (PLAVIX) 75 MG tablet, , Disp: , Rfl:   •  cyanocobalamin 1000 MCG/ML injection, Inject 1 ML IM weekly x 4 weeks then 1 ML IM monthly x 3 months, Disp: 10 mL, Rfl: 0  •  diphenhydrAMINE (BENADRYL) 25 mg capsule, Take 25 mg by mouth Every 6 (Six) Hours As Needed for Itching., Disp: , Rfl:   •  Entresto 24-26 MG tablet, , Disp: , Rfl:   •  exemestane (AROMASIN) 25 MG chemo tablet, , Disp: , Rfl:   •  Jardiance 10 MG tablet, , Disp: , Rfl:   •  metoprolol succinate XL (TOPROL-XL) 100 MG 24 hr tablet, , Disp: , Rfl:   •  pantoprazole (PROTONIX) 40 MG EC tablet, , Disp: , Rfl:   •  Syringe/Needle, Disp, 25G X 1-1/4\" 3 ML misc, Use for vitamin b12 injections, Disp: 10 each, Rfl: 0  •  Toujeo Max SoloStar 300 UNIT/ML solution pen-injector injection, , Disp: , Rfl:     Social History    Tobacco Use      Smoking status: Never Smoker      Smokeless tobacco: Never Used       Social History     Substance and Sexual Activity   Alcohol Use Not Currently       Caffeine: She admits to having 1 cup of coffee per day and may have up to 4 sodas per week    Past Medical History:   Diagnosis Date   • Anxiety    • Arthritis    • Cancer (CMS/HCC)    • Depression    • Diabetes mellitus (CMS/HCC)    • Heart disease    • Hyperlipidemia    • Hypertension        Past Surgical History:   Procedure Laterality Date   • BREAST LUMPECTOMY     • CATARACT EXTRACTION     • CORONARY STENT PLACEMENT     • GALLBLADDER SURGERY      placed and removed stent   • HYSTERECTOMY         Family History   Problem Relation Age of Onset   • Hypertension Mother    • Stroke Mother    • Heart disease Father    • Hypertension Father        The following portions of the patient's history were reviewed and updated as appropriate: allergies, current medications, past family history, past medical history, past social history, past surgical history and problem list.    Review of Systems "   Constitutional: Positive for fatigue and unexpected weight change.   HENT: Positive for congestion, postnasal drip and sinus pressure.    Eyes: Negative.    Respiratory: Positive for cough.    Cardiovascular: Negative.    Gastrointestinal: Positive for constipation.   Endocrine: Negative.    Genitourinary: Positive for frequency.   Musculoskeletal: Positive for arthralgias, back pain and joint swelling.   Skin: Negative.    Allergic/Immunologic: Positive for environmental allergies.   Neurological: Negative.    Hematological: Negative.    Psychiatric/Behavioral: Positive for dysphoric mood. The patient is nervous/anxious.    Mayetta score is 10/24    Objective     There were no vitals taken for this visit.     Physical Exam  The patient appears awake and fairly alert.  She does not appear to be in acute respiratory distress.  Her stated weight is 186 pounds.  Her height is 5 feet.  Her body mass index is 35.    Assessment/Plan   Diagnoses and all orders for this visit:    1. Snoring (Primary)    2. Obstructive sleep apnea, adult    3. PLMD (periodic limb movement disorder)    4. Circadian rhythm sleep disorder, delayed sleep phase type    5. Class 2 severe obesity due to excess calories with serious comorbidity and body mass index (BMI) of 35.0 to 35.9 in adult (CMS/[facility])    Patient is a rather tangential historian and at first denies having any sleep issues.  She does have a history of snoring and may well have obstructive sleep apnea.  She has what sounds to be periodic limb movement disorder that may be disrupting to her sleep.  Her chronic pain is also probably disruptive to her sleep.  She furthermore has delayed sleep phase syndrome and does not really try going to sleep until 2 AM and then sleeps until about 10 AM.  We discussed possible evaluation for obstructive sleep apnea.  With her current sleep schedule, it would probably require a home sleep test because she probably would not sleep long enough in the  sleep lab to have useful information.  We have discussed possible treatment of obstructive sleep apnea including CPAP, weight control, oral appliance, and surgery.  We have also discussed the long-term consequences of untreated obstructive sleep apnea including hypertension, diabetes, heart disease, stroke, and dementia.  The patient seems to be unwilling to make commitment to proceed with evaluation at this time.  She states that she wants to get her back pain addressed first.    She is encouraged to achieve ideal body weight.  She is encouraged to avoid alcohol and sedatives close to bedtime.  She is encouraged to practice lateral position sleep.    I think she may have periodic limb movement disorder and hopefully this is being treated with her gabapentin.  An in lab polysomnogram could give us more information as to how well this is treated but that does not appear to be in the works at this time.    Patient has significant delayed sleep phase syndrome.  We have discussed some measures to try to change that if she chooses to do so.  If we were to consider a polysomnogram she would need to have a bedtime much earlier in the evening.    We will plan to see the patient again in 2 months.  We can then reconsider if she would want evaluation time.  If she reaches a decision before then we would be happy to hear from her and could see her earlier.    Total time: 35 minutes exclusive of procedures.         Pete Connell MD [facility]  Sleep Medicine  Pulmonary and Critical Care Medicine

## 2021-07-09 ENCOUNTER — TELEPHONE (OUTPATIENT)
Dept: NEUROLOGY | Facility: CLINIC | Age: 81
End: 2021-07-09

## 2021-07-09 NOTE — TELEPHONE ENCOUNTER
Please notify the patient's daughter that I received her urine sample lab results from [facility] and it appears that her urine was such a bright orange that they were unable to complete the additional testing.  However they did complete a urine culture and this did not show any evidence of infection.  It showed normal growth of bacteria outside of the urinary tract.  Recommend continuing to push fluids.  If she is taking B12 supplements sometimes the vitamins can change urine to the color of orange.  No other recommendations at this time.  Thanks, LACY Zuleta.

## 2021-07-09 NOTE — TELEPHONE ENCOUNTER
I spoke with pt's daughter,zaid, and she is aware of the lab results and said she took her mother to  ER due to nausea, dizziness and the did more labs and she does have a UTI and gave her medication for this and I will request those results.

## 2021-07-09 NOTE — TELEPHONE ENCOUNTER
OK. Unfortunately when the home health collected the urine with the color it probably could not  on that. I am glad she took her to the ER and she is being treated. Thanks.

## 2021-07-12 NOTE — TELEPHONE ENCOUNTER
We have not been made aware of any of these symptoms until today. We can order additional testing on Wednesday, but she likely needs to see cardiology too. I did review her [facility] notes but there is only mention of back pain and urinary pain. No mention of dizziness etc. We will order EEG to be completed at follow up and consider MRI brain. Thanks, Shirley

## 2021-07-12 NOTE — TELEPHONE ENCOUNTER
BUDDY IS CALKING BACK TO ALSO LET GERMAN PIERSON KNOW THAT NAJMA IS BLACKING OUT WITH HER EYES OPEN AND SHE IS COMPLAINING  ABOUT DIZZINESS DURING THE DAY AND WHEN THE EPISODE OF BLACKING OUT HAPPENS SHE SLUMPS OVER AND VOMITS.   SHE DOES HAVE AN APPOINTMENT ON Wednesday THE 14TH, BUT NAJMA WAS JUST WANTING TO MAKE GERMAN AWARE OF THIS.

## 2021-07-14 ENCOUNTER — OFFICE VISIT (OUTPATIENT)
Dept: NEUROLOGY | Facility: CLINIC | Age: 81
End: 2021-07-14

## 2021-07-14 VITALS
SYSTOLIC BLOOD PRESSURE: 142 MMHG | HEIGHT: 61 IN | BODY MASS INDEX: 34.74 KG/M2 | DIASTOLIC BLOOD PRESSURE: 90 MMHG | WEIGHT: 184 LBS | OXYGEN SATURATION: 96 % | HEART RATE: 84 BPM

## 2021-07-14 DIAGNOSIS — R29.6 FREQUENT FALLS: ICD-10-CM

## 2021-07-14 DIAGNOSIS — R40.4 EPISODES OF STARING: ICD-10-CM

## 2021-07-14 DIAGNOSIS — R42 DIZZINESS: ICD-10-CM

## 2021-07-14 DIAGNOSIS — R41.3 MEMORY LOSS: ICD-10-CM

## 2021-07-14 DIAGNOSIS — E53.8 VITAMIN B12 DEFICIENCY: ICD-10-CM

## 2021-07-14 DIAGNOSIS — I67.89 CEREBRAL MICROVASCULOPATHY: Primary | ICD-10-CM

## 2021-07-14 PROCEDURE — 99215 OFFICE O/P EST HI 40 MIN: CPT | Performed by: NURSE PRACTITIONER

## 2021-07-14 RX ORDER — CEPHALEXIN 500 MG/1
CAPSULE ORAL
COMMUNITY
Start: 2021-07-09 | End: 2021-11-19

## 2021-07-14 RX ORDER — METHOCARBAMOL 500 MG/1
TABLET, FILM COATED ORAL
COMMUNITY
Start: 2021-06-16

## 2021-07-14 RX ORDER — GABAPENTIN 100 MG/1
CAPSULE ORAL
COMMUNITY
Start: 2021-06-30

## 2021-07-14 NOTE — PROGRESS NOTES
Subjective:     Patient ID: Urvashi Barrera is a 81 y.o. female.    CC:   Chief Complaint   Patient presents with   • Memory Loss       HPI:   History of Present Illness     Urvashi Barrera is a 80 y.o. female who comes to clinic today for evaluation of cognitive impairment. She has noted symptoms since at least Fall 2020 marked initially by forgetfulness , repetitiveness  and word-finding difficulties . This has gradually worsened  over time. Additional symptoms have included impairments in orientation , language  and short term memory. There have been associated  symptoms of anxiety , depression  and confusion intermittently with multiple hospitalizations. She denies  impairments in ADL's. Her family manages her medications and finances. She is no longer driving . She is currently residing at home with daughter and daughter's partner.      Moved from [place] where she was living in senior living alone. After multiple falls and an extensive hospitalization with poor diabetes control & rehab, she eventually was moved up to [place], [place] to live with daughter around October 2020. She was then diagnosed with an MI at [facility] 11/17/2021. She had rehab after this with PT/OT and had delirium. UTIs have been ruled out multiple times. She also had a Fall around Feb. 8th, 2021 and went to  ER and had Ct head and was reportedly normal. She was also admitted to  March 2021 for pancreatitis with gallstones and stent placement which has since been removed. Daughter feels like depression and anxiety have been increased and recently started Wellbutrin XL 150mg daily which has helped with anxiety, depression and less confusion. Has CKD Stage 3. Her daughter has noticed occasional hallucinations or delusions but this has typically been during illness. She completed high school and 1 year of college education. 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 She uses walker for ambulation. Has  some tremors in both hands present for several years and not resting or pill rolling. She does not sleep well and has excessive daytime sleepiness. Her daughter is concerned about Dementia. Has poor vision with left retinal tear and cataract surgery bilaterally. Wears glasses.     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 CTA head and neck 2/2021 showed no significant plaque. She is on aspirin 81mg daily, Plavix 75mg daily and Lipitor 80mg at night.    Today- 7/14/2021: Mrs. Conway is accompanied by her daughter.  Since last visit they report she has fallen 8 times over the past month.  This is typically when she goes from sitting to standing.  On 7/8/2020 when she was in the shower and fell back and her eyes were open and she was not responding for about 30 to 40 seconds and then she was able to respond and kept stating she did not want to go to the hospital.  She did have dizziness, nausea and vomiting with some of the spells.  She did not bite her tongue, her daughter did note some twitching, no urinary or bowel incontinence and no confusion afterwards.  She was taken to  ER for further evaluation and I was able to review all of her records today and she did have a CTA of her head and neck which showed moderate atherosclerosis.  She also had a repeat CT scan of the head which showed stable extensive and advanced chronic microvascular ischemic infarcts with advanced atrophy and no acute intracranial abnormalities.  She continues on her aspirin, Plavix and high-dose Lipitor daily.  She and her daughter feel that her memory has been stable overall.  She has finally been able to receive her vitamin B12 shots for her low vitamin B12 level of 284 with methylmalonic acid level of 539 elevated.  We had to complete an appeal to have her insurance approve this but now she is getting her  injections.  She was scheduled to have an EEG earlier last month but unfortunately her daughter cannot get off work so this has to be rescheduled.  There was not mention of seizure concern during recent  ER visit.  She does have home health coming into work with her on physical therapy and Occupational Therapy.  She did have labs additionally since last visit including TSH normal, CMP with creatinine 1.16 and BUN 24 with GFR of 45, CBC with differential normal and free T4 normal.  Drug screen in ER on 7/9/2021 -, UA positive for UTI and treated with antibiotics, HIV negative, hepatitis C negative, CBC stable, alcohol negative, PT/INR normal, type and screen O- with antibody negative, CMP with increased creatinine of 1.46 and BUN 33.  She does see cardiology at  and they are aware of her spells of dizziness and altered mental status.  She is supposed to follow-up with them. Had sleep consult. Pending sleep study.    With her most recent fall she has unfortunately developed some significant left-sided sciatica with numbness and tingling in the left leg.  She has undergone an x-ray of her lumbar spine and her left knee.  She is scheduled to have an MRI of her lumbar spine at [facility] on 7/19/2021.  Her PCP is addressing these concerns.  Steroids and muscle relaxer have not been helpful.  She is using her walker.  She is hoping to be referred to orthopedics soon.    The following portions of the patient's history were reviewed and updated as appropriate: allergies, current medications, past family history, past medical history, past social history, past surgical history and problem list.    Past Medical History:   Diagnosis Date   • Anxiety    • Arthritis    • Cancer (CMS/HCC)    • Depression    • Diabetes mellitus (CMS/HCC)    • Heart disease    • Hyperlipidemia    • Hypertension        Past Surgical History:   Procedure Laterality Date   • BREAST LUMPECTOMY     • CATARACT EXTRACTION     • CORONARY STENT  PLACEMENT     • GALLBLADDER SURGERY      placed and removed stent   • HYSTERECTOMY         Social History     Socioeconomic History   • Marital status: Unknown     Spouse name: Not on file   • Number of children: Not on file   • Years of education: Not on file   • Highest education level: Not on file   Tobacco Use   • Smoking status: Never Smoker   • Smokeless tobacco: Never Used   Vaping Use   • Vaping Use: Never used   Substance and Sexual Activity   • Alcohol use: Not Currently   • Drug use: Never   • Sexual activity: Not Currently       Family History   Problem Relation Age of Onset   • Hypertension Mother    • Stroke Mother    • Heart disease Father    • Hypertension Father         Review of Systems   Constitutional: Negative for chills, fatigue, fever and unexpected weight change.   HENT: Negative for ear pain, hearing loss, nosebleeds, rhinorrhea and sore throat.    Eyes: Negative for photophobia, pain, discharge, itching and visual disturbance.   Respiratory: Negative for cough, chest tightness, shortness of breath and wheezing.    Cardiovascular: Negative for chest pain, palpitations and leg swelling.   Gastrointestinal: Negative for abdominal pain, blood in stool, constipation, diarrhea, nausea and vomiting.   Genitourinary: Negative for dysuria, frequency, hematuria and urgency.   Musculoskeletal: Positive for gait problem. Negative for arthralgias, back pain, joint swelling, myalgias, neck pain and neck stiffness.   Skin: Negative for rash and wound.   Allergic/Immunologic: Negative for environmental allergies and food allergies.   Neurological: Positive for syncope and light-headedness. Negative for dizziness, tremors, seizures, speech difficulty, weakness, numbness and headaches.   Hematological: Negative for adenopathy. Does not bruise/bleed easily.   Psychiatric/Behavioral: Positive for decreased concentration. Negative for agitation, confusion, hallucinations, sleep disturbance and suicidal ideas.  "The patient is not nervous/anxious.    All other systems reviewed and are negative.       Objective:  /90   Pulse 84   Ht 154.9 cm (61\")   Wt 83.5 kg (184 lb)   SpO2 96%   BMI 34.77 kg/m²     Neurologic Exam     Mental Status   Oriented to person, place, and time.   Registration: recalls 3 of 3 objects. Recall at 5 minutes: recalls 3 of 3 objects.   Speech: speech is normal   Level of consciousness: alert    Cranial Nerves   Cranial nerves II through XII intact.     Motor Exam   Muscle bulk: normal  Overall muscle tone: normal    Strength   Strength 5/5 except as noted.   Limited ROM left knee/lower back     Gait, Coordination, and Reflexes     Gait  Gait: wide-based (antalgic d/t left sciatica)    Coordination   Finger to nose coordination: normal    Tremor   Resting tremor: absent  Intention tremor: present (mild BUE kinetic hand tremor)  Action tremor: absent    Reflexes   Right brachioradialis: 2+  Left brachioradialis: 2+  Right biceps: 2+  Left biceps: 2+  Right : 2+  Left : 2+      Physical Exam  Constitutional:       Appearance: Normal appearance.   Neurological:      Mental Status: She is alert and oriented to person, place, and time.      Coordination: Finger-Nose-Finger Test normal.      Deep Tendon Reflexes:      Reflex Scores:       Bicep reflexes are 2+ on the right side and 2+ on the left side.       Brachioradialis reflexes are 2+ on the right side and 2+ on the left side.  Psychiatric:         Mood and Affect: Mood is anxious.         Speech: Speech normal.         Behavior: Behavior normal.         Thought Content: Thought content normal.         Cognition and Memory: Cognition and memory normal.         Judgment: Judgment normal.     MMSE 28/30 delayed recall 3/3    Assessment/Plan:       Diagnoses and all orders for this visit:    1. Cerebral microvasculopathy (Primary)  Comments:  continue DAPT/Statin therapy    2. Episodes of staring  -     EEG Continuous Monitoring With " Video; Future    3. Frequent falls  Comments:  continue PT/OT, walker    4. Memory loss  Comments:  monitor for now    5. Dizziness  Comments:  multifactorial, push fluids, change positions slowly, f/u with cardiology as scheduled    6. Vitamin B12 deficiency  Comments:  continue B12 injections and repeat labs next visit          Total time of visit today was 40 minutes spent reviewing all of her records from , discussion with the patient and her daughter and discussion of plan of care moving forward including documentation.    Her memory seems to be stable overall.  We will wait on treating with any additional medication.  They do report that she was placed on the Wellbutrin XL about 6 to 8 months ago to help with her anxiety, depression and energy.  If EEG shows any evidence of seizure activity we could consider changing this medication or weaning.  I did discuss if she has any additional episodes have altered mental status we could consider a trial of very low-dose divalproex.  This could help with her mood as well as prevent any type of possible partial seizures.  Continue aspirin, Plavix and Lipitor for extensive cerebral microvascular apathy.  We will continue to monitor her memory and early signs of vascular dementia could be present but at this time she is doing well on cognitive testing so we will continue to monitor.  Continue management of diabetes, hypertension and hyperlipidemia with PCP.  They are to follow-up on her MRI of L-spine with PCP and discuss referral to orthopedics or neurosurgery based on those results.  Continue vitamin B12 injections with home health.  Continue PT and OT.  She will follow-up in 3 months or sooner if needed.    Reviewed medications, potential side effects and signs and symptoms to report. Discussed risk versus benefits of treatment plan with patient and/or family-including medications, labs and radiology that may be ordered. Addressed questions and concerns during visit.  Patient and/or family verbalized understanding and agree with plan.    AS THE PROVIDER, I PERSONALLY WORE PPE DURING ENTIRE FACE TO FACE ENCOUNTER IN CLINIC WITH THE PATIENT. PATIENT ALSO WORE PPE DURING ENTIRE FACE TO FACE ENCOUNTER EXCEPT FOR A MAX OF 30 SECONDS DURING NEUROLOGICAL EVALUATION OF CRANIAL NERVES AND THEN MASK WAS PLACED BACK OVER PATIENT FACE FOR REMAINDER OF VISIT. I WASHED MY HANDS BEFORE AND AFTER VISIT.    During this visit the following were done:  Labs Reviewed [x]    Labs Ordered []    Radiology Reports Reviewed [x]    Radiology Ordered [x]    PCP Records Reviewed [x]    Referring Provider Records Reviewed []    ER Records Reviewed [x]    Hospital Records Reviewed [x]    History Obtained From Family [x]    Radiology Images Reviewed []    Other Reviewed [x]    Records Requested []      Shirley Billingsley, LACY  7/14/2021

## 2021-07-19 ENCOUNTER — HOSPITAL ENCOUNTER (OUTPATIENT)
Dept: MRI IMAGING | Facility: HOSPITAL | Age: 81
Discharge: HOME | End: 2021-07-19
Admitting: INTERNAL MEDICINE

## 2021-07-19 DIAGNOSIS — M54.42 LOW BACK PAIN WITH LEFT-SIDED SCIATICA, UNSPECIFIED BACK PAIN LATERALITY, UNSPECIFIED CHRONICITY: ICD-10-CM

## 2021-07-19 PROCEDURE — 72148 MRI LUMBAR SPINE W/O DYE: CPT

## 2021-08-06 ENCOUNTER — TELEPHONE (OUTPATIENT)
Dept: NEUROLOGY | Facility: CLINIC | Age: 81
End: 2021-08-06

## 2021-08-06 NOTE — TELEPHONE ENCOUNTER
Caller: THAO WITH NURSE ON CALL    Best call back number: [phone]    What orders are you requesting (i.e. lab or imaging): VERBAL ORDER FOR UA    Additional notes: THAO WITH NURSE ON CALL HAD FAXED THE ORDER FOR SIGNATURE FOR THE VERBAL ORDER FOR A UA FOR THE PT. SHE FAXED IT ON 7-14-21 & 8-3-21 -[phone]. SHE IS GOING TO FAX IT TO THE [facility] [phone]

## 2021-08-17 ENCOUNTER — HOSPITAL ENCOUNTER (OUTPATIENT)
Dept: NEUROLOGY | Facility: HOSPITAL | Age: 81
Discharge: HOME | End: 2021-08-17
Admitting: NURSE PRACTITIONER

## 2021-08-17 DIAGNOSIS — R40.4 EPISODES OF STARING: ICD-10-CM

## 2021-08-17 PROCEDURE — 95713 VEEG 2-12 HR CONT MNTR: CPT

## 2021-08-18 NOTE — PROGRESS NOTES
The EEG is normal for her age.  Epileptiform mean seizure activity and there is no evidence of seizure activity on her brain.  No need to be seen sooner for appointment.  We will follow-up as scheduled in clinic.  Thanks, LACY Zuleta.    Fax copy to PCP. Sent response in [employer] message too.

## 2021-08-20 ENCOUNTER — TELEPHONE (OUTPATIENT)
Dept: NEUROLOGY | Facility: CLINIC | Age: 81
End: 2021-08-20

## 2021-08-20 NOTE — TELEPHONE ENCOUNTER
----- Message from LACY Carver sent at 8/18/2021  1:18 PM EDT -----  The EEG is normal for her age.  Epileptiform mean seizure activity and there is no evidence of seizure activity on her brain.  No need to be seen sooner for appointment.  We will follow-up as scheduled in clinic.  Thanks, LACY Zuleta.    Fax copy to PCP. Sent response in [employer] message too.

## 2021-08-23 NOTE — TELEPHONE ENCOUNTER
"Occupational Therapy  Daily Treatment     Patient Name: Ian Laird  Age:  71 y.o., Sex:  male  Medical Record #: [id number]  Today's Date: 8/14/2019     Precautions  Precautions: (P) Fall Risk, Spinal / Back Precautions   Comments: (P) T3-T7 spinal fusion    Safety   ADL Safety : Requires Supervision for Safety, Requires Physical Assist for Safety  Bathroom Safety: Requires Supervision for Safety    Subjective    \"You guys really have it out for me, huh?\" - pt using humor to build rapport in reference to activity demands in therapy sesion.     Objective       08/14/19 1301   Precautions   Precautions Fall Risk;Spinal / Back Precautions    Comments T3-T7 spinal fusion   Pain 0 - 10 Group   Location   (left underarm/lateral rib cage (pt described felt strained)   Non Verbal Descriptors   Non Verbal Scale  Calm   Interdisciplinary Plan of Care Collaboration   Patient Position at End of Therapy In Bed;Chair Alarm On;Phone within Reach;Tray Table within Reach     Exercises in // bars:  STS from w/c using chair arm and // bar for BUE support: 4 sets of 5  Balloon toss w/ CGA - mod A standing w/in // bars : 2 sets of ~ 20 tosses  Ball toss w/ CGA - mod A standing w/in // bars: 1 set of ~20 tosses     UE exercises:  10 mins motomed BUE only: 5 mins forward, rest break, 5 mins backward (resistance level 2)  Rickshaw: 1 set of 10 reps 25#, 1 set of 10 reps 32.5#  Lat pull down: 2 sets of 10, 20#  Flys: 2 sets of 10, 20#  Bilateral rows: 2 sets of 10, 20#    Assessment    Patient educated on hand placement for safe STS during exercise. Pt required mod A recovering balance ~3x during balloon and ball toss activity. Pt described feeling burning fatigue in legs from strengthening exercises. Pt educated on wide AILEEN and core activtation to improve dynamic standing balance. Pt also went through plans for transition to home (HEP, OP OT, grab bar installation in shower). Pt described sleeping better last night (sleeping on side " FAXED TO PCP   for first time in a while) and appeared more alert oriented and motivated to perform activity than yesterday.     Plan    Continue to build endurance, strenghth, safety awareness,and  insight into deficits to maximize ADL/IADL independence prior to d/c. Plan to do a cooking task and possible grocery shopping task prior to d/c .     Picato Counseling:  I discussed with the patient the risks of Picato including but not limited to erythema, scaling, itching, weeping, crusting, and pain.

## 2021-10-12 ENCOUNTER — TRANSCRIBE ORDERS (OUTPATIENT)
Dept: ADMINISTRATIVE | Facility: HOSPITAL | Age: 81
End: 2021-10-12

## 2021-10-12 DIAGNOSIS — R22.32 ARM MASS, LEFT: Primary | ICD-10-CM

## 2021-10-29 ENCOUNTER — HOSPITAL ENCOUNTER (OUTPATIENT)
Dept: ULTRASOUND IMAGING | Facility: HOSPITAL | Age: 81
Discharge: HOME | End: 2021-10-29
Admitting: NURSE PRACTITIONER

## 2021-10-29 DIAGNOSIS — R22.32 ARM MASS, LEFT: ICD-10-CM

## 2021-10-29 PROCEDURE — 76882 US LMTD JT/FCL EVL NVASC XTR: CPT

## 2021-11-19 ENCOUNTER — CONSULT (OUTPATIENT)
Dept: ONCOLOGY | Facility: CLINIC | Age: 81
End: 2021-11-19

## 2021-11-19 VITALS
HEART RATE: 77 BPM | SYSTOLIC BLOOD PRESSURE: 113 MMHG | WEIGHT: 189.4 LBS | DIASTOLIC BLOOD PRESSURE: 70 MMHG | TEMPERATURE: 96.6 F | OXYGEN SATURATION: 96 % | BODY MASS INDEX: 35.76 KG/M2 | HEIGHT: 61 IN

## 2021-11-19 DIAGNOSIS — Z17.0 MALIGNANT NEOPLASM OF UPPER-OUTER QUADRANT OF RIGHT BREAST IN FEMALE, ESTROGEN RECEPTOR POSITIVE (HCC): Primary | ICD-10-CM

## 2021-11-19 DIAGNOSIS — Z79.811 LONG TERM (CURRENT) USE OF AROMATASE INHIBITORS: ICD-10-CM

## 2021-11-19 DIAGNOSIS — C50.411 MALIGNANT NEOPLASM OF UPPER-OUTER QUADRANT OF RIGHT BREAST IN FEMALE, ESTROGEN RECEPTOR POSITIVE (HCC): Primary | ICD-10-CM

## 2021-11-19 PROCEDURE — 99205 OFFICE O/P NEW HI 60 MIN: CPT | Performed by: INTERNAL MEDICINE

## 2021-11-19 RX ORDER — EXEMESTANE 25 MG/1
25 TABLET ORAL DAILY
Qty: 90 TABLET | Refills: 1 | Status: SHIPPED | OUTPATIENT
Start: 2021-11-19 | End: 2022-05-27

## 2021-11-19 RX ORDER — APIXABAN 5 MG/1
TABLET, FILM COATED ORAL
COMMUNITY
Start: 2021-10-20

## 2021-11-19 NOTE — PROGRESS NOTES
New Patient Office Visit      Date: 2021     Patient Name: Urvashi Barrera  MRN: [id number]  : 1940  Referring Physician: Jean Claude Alvarado    Chief Complaint: Establish care for right breast cancer, ER/WV positive, HER-2/alek negative    History of Present Illness: Urvashi Barrera is a pleasant 81 y.o. female past medical history of right-sided breast cancer status post right mastectomy and partial mastectomy of the left breast, currently receiving endocrine therapy, CAD status post MI in , CHF, hyperlipidemia, hypertension, type 2 diabetes, GERD who presents today for evaluation of right breast cancer. The patient is accompanied by their daughter who contributes to the history of their care.  Patient was diagnosed with right breast invasive ductal carcinoma in 2012.  She underwent a right mastectomy and a partial left mastectomy.  Pathology was consistent with a T2 invasive ductal carcinoma, ER/WV positive, HER-2/alek negative.  0 lymph nodes were positive for disease.  Surgical margins were negative.  There was a component of LCIS in the left breast but no invasive disease.  She was started on exemestane with plans to treat for about 10 years.  Her oncologic course has been complicated by chest wall discomfort secondary to neuropathy which is improved with gabapentin along with osteopenia/porosis diagnosed on DEXA scan in 2019.  She states she has not been on calcium or vitamin D.  She has significant left sciatic pain secondary to lumbar stenosis.  She is following with orthopedic/pain for management of this.  She otherwise tolerates her exemestane well and denies any significant arthralgias, hot flashes, fatigue    Oncology History:    Oncology/Hematology History    No history exists.       Subjective      Review of Systems:     Constitutional: Negative for fevers, chills, or weight loss  Eyes: Negative for blurred vision or discharge         Ear/Nose/Throat: Negative for difficulty  swallowing, sore throat, LAD                                                       Respiratory: Negative for cough, SOA, wheezing                                                                                        Cardiovascular: Negative for chest pain or palpitations                                                                  Gastrointestinal: Negative for nausea, vomiting or diarrhea                                                                     Genitourinary: Negative for dysuria or hematuria                                                                                           Musculoskeletal: Negative for any joint pains or muscle aches                                                                        Neurologic: Negative for any weakness, headaches, dizziness                                                                         Hematologic: Negative for any easy bleeding or bruising                                                                                   Psychiatric: Negative for anxiety or depression                             Past Medical History:   Past Medical History:   Diagnosis Date   • Anxiety    • Arthritis    • Breast cancer (HCC)    • Cancer (HCC)    • Depression    • Diabetes mellitus (HCC)    • Heart disease    • Hyperlipidemia    • Hypertension        Past Surgical History:   Past Surgical History:   Procedure Laterality Date   • BREAST LUMPECTOMY     • CATARACT EXTRACTION     • CORONARY STENT PLACEMENT     • GALLBLADDER SURGERY      placed and removed stent   • HYSTERECTOMY         Family History:   Family History   Problem Relation Age of Onset   • Hypertension Mother    • Stroke Mother    • Heart disease Father    • Hypertension Father        Social History:   Social History     Socioeconomic History   • Marital status: Unknown   Tobacco Use   • Smoking status: Never Smoker   • Smokeless tobacco: Never Used   Vaping Use   • Vaping Use: Never used   Substance  "and Sexual Activity   • Alcohol use: Not Currently   • Drug use: Never   • Sexual activity: Defer       Medications:     Current Outpatient Medications:   •  Accu-Chek Brit Plus test strip, , Disp: , Rfl:   •  atorvastatin (LIPITOR) 80 MG tablet, , Disp: , Rfl:   •  buPROPion XL (WELLBUTRIN XL) 150 MG 24 hr tablet, , Disp: , Rfl:   •  diphenhydrAMINE (BENADRYL) 25 mg capsule, Take 25 mg by mouth Every 6 (Six) Hours As Needed for Itching., Disp: , Rfl:   •  Eliquis 5 MG tablet tablet, , Disp: , Rfl:   •  Entresto 24-26 MG tablet, , Disp: , Rfl:   •  exemestane (AROMASIN) 25 MG chemo tablet, Take 1 tablet by mouth Daily., Disp: 90 tablet, Rfl: 1  •  gabapentin (NEURONTIN) 100 MG capsule, , Disp: , Rfl:   •  Jardiance 10 MG tablet, , Disp: , Rfl:   •  methocarbamol (ROBAXIN) 500 MG tablet, , Disp: , Rfl:   •  metoprolol succinate XL (TOPROL-XL) 100 MG 24 hr tablet, , Disp: , Rfl:   •  pantoprazole (PROTONIX) 40 MG EC tablet, , Disp: , Rfl:   •  Syringe/Needle, Disp, 25G X 1-1/4\" 3 ML misc, Use for vitamin b12 injections, Disp: 10 each, Rfl: 0  •  Toujeo Max SoloStar 300 UNIT/ML solution pen-injector injection, , Disp: , Rfl:   •  Calcium Carbonate-Vit D-Min (Calcium-Vitamin D-Minerals) 600-800 MG-UNIT chewable tablet, Chew 1 tablet Daily., Disp: 90 tablet, Rfl: 3    Allergies:   Allergies   Allergen Reactions   • Dust Mite Extract Hives   • Poison Ivy Extract Hives   • Pollen Extract Hives       Objective     Physical Exam:  Vital Signs:   Vitals:    11/19/21 1329   BP: 113/70   Pulse: 77   Temp: 96.6 °F (35.9 °C)   SpO2: 96%   Weight: 85.9 kg (189 lb 6.4 oz)   Height: 154.9 cm (61\")   PainSc:   8   PainLoc: Hip     Pain Score    11/19/21 1329   PainSc:   8   PainLoc: Hip     ECOG Performance Status: 1 - Symptomatic but completely ambulatory    Constitutional: NAD, ECOG 1  Eyes: PERRLA, scleral anicteric  ENT: No LAD, no thyromegaly  Respiratory: CTAB, no wheezing, rales, rhonchi  Cardiovascular: RRR, no murmurs, " pulses 2+ bilaterally  Abdomen: soft, NT/ND, no HSM  Musculoskeletal: strength 5/5 bilaterally, no c/c/e  Neurologic: A&O x 3, CN II-XII intact grossly  Psych: mood and affect congruent, no SI or HI    Results Review:   No visits with results within 2 Week(s) from this visit.   Latest known visit with results is:   Office Visit on 05/12/2021   Component Date Value Ref Range Status   • Free T4 05/12/2021 1.18  0.93 - 1.70 ng/dL Final    Results may be falsely increased if patient taking Biotin.   • WBC 05/12/2021 5.30  3.40 - 10.80 10*3/mm3 Final   • RBC 05/12/2021 4.24  3.77 - 5.28 10*6/mm3 Final   • Hemoglobin 05/12/2021 13.2  12.0 - 15.9 g/dL Final   • Hematocrit 05/12/2021 40.6  34.0 - 46.6 % Final   • MCV 05/12/2021 95.8  79.0 - 97.0 fL Final   • MCH 05/12/2021 31.1  26.6 - 33.0 pg Final   • MCHC 05/12/2021 32.5  31.5 - 35.7 g/dL Final   • RDW 05/12/2021 13.3  12.3 - 15.4 % Final   • Platelets 05/12/2021 199  140 - 450 10*3/mm3 Final   • Neutrophil Rel % 05/12/2021 59.2  42.7 - 76.0 % Final   • Lymphocyte Rel % 05/12/2021 28.7  19.6 - 45.3 % Final   • Monocyte Rel % 05/12/2021 9.1  5.0 - 12.0 % Final   • Eosinophil Rel % 05/12/2021 2.6  0.3 - 6.2 % Final   • Basophil Rel % 05/12/2021 0.2  0.0 - 1.5 % Final   • Neutrophils Absolute 05/12/2021 3.14  1.70 - 7.00 10*3/mm3 Final   • Lymphocytes Absolute 05/12/2021 1.52  0.70 - 3.10 10*3/mm3 Final   • Monocytes Absolute 05/12/2021 0.48  0.10 - 0.90 10*3/mm3 Final   • Eosinophils Absolute 05/12/2021 0.14  0.00 - 0.40 10*3/mm3 Final   • Basophils Absolute 05/12/2021 0.01  0.00 - 0.20 10*3/mm3 Final   • Immature Granulocyte Rel % 05/12/2021 0.2  0.0 - 0.5 % Final   • Immature Grans Absolute 05/12/2021 0.01  0.00 - 0.05 10*3/mm3 Final   • nRBC 05/12/2021 0.0  0.0 - 0.2 /100 WBC Final   • Glucose 05/12/2021 164* 65 - 99 mg/dL Final   • BUN 05/12/2021 24* 8 - 23 mg/dL Final   • Creatinine 05/12/2021 1.16* 0.57 - 1.00 mg/dL Final   • eGFR Non African Am 05/12/2021 45* >60  mL/min/1.73 Final    Comment: The MDRD GFR formula is only valid for adults with stable  renal function between ages 18 and 70.     • eGFR  Am 05/12/2021 54* >60 mL/min/1.73 Final   • BUN/Creatinine Ratio 05/12/2021 20.7  7.0 - 25.0 Final   • Sodium 05/12/2021 141  136 - 145 mmol/L Final   • Potassium 05/12/2021 3.9  3.5 - 5.2 mmol/L Final    Comment: Slight hemolysis detected by analyzer. Results may be  affected.     • Chloride 05/12/2021 104  98 - 107 mmol/L Final   • Total CO2 05/12/2021 23.6  22.0 - 29.0 mmol/L Final   • Calcium 05/12/2021 9.8  8.6 - 10.5 mg/dL Final   • Total Protein 05/12/2021 6.6  6.0 - 8.5 g/dL Final   • Albumin 05/12/2021 4.10  3.50 - 5.20 g/dL Final   • Globulin 05/12/2021 2.5  gm/dL Final   • A/G Ratio 05/12/2021 1.6  g/dL Final   • Total Bilirubin 05/12/2021 0.4  0.0 - 1.2 mg/dL Final   • Alkaline Phosphatase 05/12/2021 117  39 - 117 U/L Final   • AST (SGOT) 05/12/2021 17  1 - 32 U/L Final   • ALT (SGPT) 05/12/2021 14  1 - 33 U/L Final   • Methylmalonic Acid 05/12/2021 539* 0 - 378 nmol/L Final   • Disclaimer: 05/12/2021 Comment   Final    Comment: This test was developed and its performance characteristics  determined by [facility]. It has not been cleared or approved  by the Food and Drug Administration.     • Vitamin B-12 05/12/2021 284  211 - 946 pg/mL Final    Results may be falsely increased if patient taking Biotin.   • Folate 05/12/2021 16.20  4.78 - 24.20 ng/mL Final    Results may be falsely increased if patient taking Biotin.   • TSH 05/12/2021 2.060  0.270 - 4.200 uIU/mL Final       US Nonvascular Extremity Limited    Result Date: 11/1/2021  Narrative: EXAMINATION: US NONVASCULAR EXTREMITY LIMITED - 10/29/2021  INDICATION: R22.32-Localized swelling, mass and lump, left upper limb. Mass along the left forearm.  TECHNIQUE: Sonographic imaging is obtained left forearm in the area of interest felt by the patient in the sagittal and transverse planes.  COMPARISON: None   FINDINGS: There is an avascular elliptical isoechoic structure identified in the subcutaneous tissues measuring 2.6 cm in its longest dimension. Findings most suggestive of a lipoma. The vasculature visualized is intact and unremarkable.      Impression: Small lipoma identified of the palpable area of interest felt by the patient.  DICTATED:   10/29/2021 EDITED/lfs:   10/29/2021    This report was finalized on 11/1/2021 9:39 AM by Dr. Demi Witt MD.        Assessment / Plan      Assessment/Plan:   Diagnoses and all orders for this visit:    1. Malignant neoplasm of upper-outer quadrant of right breast in female, estrogen receptor positive (HCC) (Primary)  -Initially diagnosed in 2012  -Status post right mastectomy and partial left mastectomy  -Pathology from the right breast consistent with invasive ductal carcinoma, ER/MO positive, HER-2/alek negative, 0 lymph nodes positive for disease  -Pathology from left breast showing a component of LCIS but no invasive disease  -Pathologic stage IA (T2,N0,M0)  -Was started on exemestane by her primary oncologist in [place].  Plan was to complete 10 years of treatment which she will finish in April/May 2022  -Previous DEXA scan in 2019 concerning for osteopenia/porosis.  Patient not currently on calcium/vitamin D  -We will repeat DEXA scan today  -Last mammogram was in July 2020.  Repeat left mammogram ordered today which she will need yearly  -Refilled exemestane today.  Started patient on calcium/vitamin D    2.  History of CAD and CHF  -Status post MI in 2020  -Currently on Eliquis, atorvastatin, Entresto    3.  Left sciatic leg pain  -Secondary to lumbar stenosis  -Follow with orthopedic/pain      4.  Type 2 diabetes  -Complicates all aspects of care  -Follow with her PCP    I spent over 60 minutes on patient's initial day of visit reviewing her outside progress notes, imaging, pathology as well as discussing her diagnosis, prognosis, and treatment plans with  patient and her daughter and documenting her case into the medical record    Follow Up:   Follow-up in 6 months    Dmitry Dupree MD  Hematology and Oncology     Please note that portions of this note may have been completed with a voice recognition program. Efforts were made to edit the dictations, but occasionally words are mistranscribed.

## 2021-12-15 ENCOUNTER — OFFICE VISIT (OUTPATIENT)
Dept: NEUROLOGY | Facility: CLINIC | Age: 81
End: 2021-12-15

## 2021-12-15 ENCOUNTER — LAB (OUTPATIENT)
Dept: LAB | Facility: HOSPITAL | Age: 81
End: 2021-12-15

## 2021-12-15 VITALS
HEART RATE: 79 BPM | WEIGHT: 187 LBS | SYSTOLIC BLOOD PRESSURE: 118 MMHG | OXYGEN SATURATION: 98 % | DIASTOLIC BLOOD PRESSURE: 76 MMHG | BODY MASS INDEX: 35.33 KG/M2

## 2021-12-15 DIAGNOSIS — R29.6 FREQUENT FALLS: ICD-10-CM

## 2021-12-15 DIAGNOSIS — R40.4 EPISODES OF STARING: ICD-10-CM

## 2021-12-15 DIAGNOSIS — E53.8 VITAMIN B12 DEFICIENCY: ICD-10-CM

## 2021-12-15 DIAGNOSIS — G31.84 MILD COGNITIVE IMPAIRMENT: ICD-10-CM

## 2021-12-15 DIAGNOSIS — I67.89 CEREBRAL MICROVASCULOPATHY: Primary | ICD-10-CM

## 2021-12-15 PROBLEM — I50.20 SYSTOLIC CONGESTIVE HEART FAILURE: Status: ACTIVE | Noted: 2020-12-02

## 2021-12-15 PROBLEM — I25.10 CORONARY ARTERY DISEASE INVOLVING NATIVE CORONARY ARTERY OF NATIVE HEART: Status: ACTIVE | Noted: 2021-07-27

## 2021-12-15 PROBLEM — I48.0 PAF (PAROXYSMAL ATRIAL FIBRILLATION): Status: ACTIVE | Noted: 2021-07-27

## 2021-12-15 PROBLEM — K83.1 BILIARY OBSTRUCTION: Status: ACTIVE | Noted: 2021-03-02

## 2021-12-15 PROBLEM — I21.3 STEMI (ST ELEVATION MYOCARDIAL INFARCTION): Status: ACTIVE | Noted: 2020-12-02

## 2021-12-15 LAB — VIT B12 SERPL-MCNC: 546 PG/ML (ref 211–946)

## 2021-12-15 PROCEDURE — 99214 OFFICE O/P EST MOD 30 MIN: CPT | Performed by: NURSE PRACTITIONER

## 2021-12-15 NOTE — PROGRESS NOTES
Subjective:     Patient ID: Urvashi Barrera is a 81 y.o. female.    CC:   Chief Complaint   Patient presents with   • Memory Loss       HPI:   History of Present Illness   Urvashi Barrera is a 80 y.o. female who comes to clinic today for evaluation of cognitive impairment. She has noted symptoms since at least Fall 2020 marked initially by forgetfulness , repetitiveness  and word-finding difficulties . This has gradually worsened  over time. Additional symptoms have included impairments in orientation , language  and short term memory. There have been associated  symptoms of anxiety , depression  and confusion intermittently with multiple hospitalizations. She denies  impairments in ADL's. Her family manages her medications and finances. She is no longer driving . She is currently residing at home.      Moved from [place] where she was living in senior living alone. After multiple falls and an extensive hospitalization with poor diabetes control & rehab, she eventually was moved up to [place], [place] to live with daughter around October 2020. She was then diagnosed with an MI at [facility] 11/17/2021. She had rehab after this with PT/OT and had delirium. UTIs have been ruled out multiple times. She also had a Fall around Feb. 8th, 2021 and went to  ER and had Ct head and was reportedly normal. She was also admitted to  March 2021 for pancreatitis with gallstones and stent placement which has since been removed. Daughter feels like depression and anxiety have been increased and recently started Wellbutrin XL 150mg daily which has helped with anxiety, depression and less confusion. Has CKD Stage 3. Her daughter has noticed occasional hallucinations or delusions but this has typically been during illness. She completed high school and 1 year of college education. 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 She uses walker for ambulation. Has some tremors in both hands present  for several years and not resting or pill rolling. She does not sleep well and has excessive daytime sleepiness. Her daughter is concerned about Dementia. Has poor vision with left retinal tear and cataract surgery bilaterally. Wears glasses.     CT Head 2/2021 at  per radiology report shows extensive and advanced chronic microvascular ischemic infarcts with advanced atrophy and no acute intracranial abnormalities. CTA head and neck 2/2021 showed no significant plaque. 7/2021-CTA of her head and neck which showed moderate atherosclerosis.  She also had a repeat CT scan of the head which showed stable extensive and advanced chronic microvascular ischemic infarcts with advanced atrophy and no acute intracranial abnormalities.      Today 12/15/2021- Mrs. Conway is accompanied by her caregiver.  She is no longer living with her daughter but living in an apartment and she has a caregiver Monday through Friday for 5 hours/day.  Since last visit she has had an MI and has been evaluated by  cardiology.  She was also found to have atrial fibrillation.  She has been taken off the Plavix and aspirin and placed on Eliquis along with a atorvastatin.  She has completed her vitamin B12 injections with her PCP.  She has not had repeat labs.  She continues to do well overall with her memory.  She denies any additional staring spells and the caregiver with her today has not seen any additional staring spells.  She did have EEG completed with continuous monitoring with video on for 2 hours on 8/17/2021 and this showed mild generalized slowing with no epileptiform activity seen.  She is currently not on any cognitive enhancers.  She feels that she is doing well overall.  She is using her walker for ambulation.  She tells me that she has had a minor fall since last visit but no major falls or injuries.  She has been referred for sleep study and she canceled this due to some issues with her back.  Her daughter will call to reschedule.   There are no new concerns today.  She does report some difficulty with managing her blood sugars and is going to have her daughter schedule an appointment with an endocrinologist.    The following portions of the patient's history were reviewed and updated as appropriate: allergies, current medications, past family history, past medical history, past social history, past surgical history and problem list.    Past Medical History:   Diagnosis Date   • Anxiety    • Arthritis    • Breast cancer (HCC)    • Cancer (HCC)    • Depression    • Diabetes mellitus (HCC)    • Heart disease    • Hyperlipidemia    • Hypertension        Past Surgical History:   Procedure Laterality Date   • BREAST LUMPECTOMY     • CATARACT EXTRACTION     • CORONARY STENT PLACEMENT     • GALLBLADDER SURGERY      placed and removed stent   • HYSTERECTOMY         Social History     Socioeconomic History   • Marital status: Unknown   Tobacco Use   • Smoking status: Never Smoker   • Smokeless tobacco: Never Used   Vaping Use   • Vaping Use: Never used   Substance and Sexual Activity   • Alcohol use: Not Currently   • Drug use: Never   • Sexual activity: Not Currently       Family History   Problem Relation Age of Onset   • Hypertension Mother    • Stroke Mother    • Heart disease Father    • Hypertension Father         Review of Systems   Constitutional: Negative for chills, fatigue, fever and unexpected weight change.   HENT: Negative for ear pain, hearing loss, nosebleeds, rhinorrhea and sore throat.    Eyes: Negative for photophobia, pain, discharge, itching and visual disturbance.   Respiratory: Negative for cough, chest tightness, shortness of breath and wheezing.    Cardiovascular: Negative for chest pain, palpitations and leg swelling.   Gastrointestinal: Negative for abdominal pain, blood in stool, constipation, diarrhea, nausea and vomiting.   Genitourinary: Negative for dysuria, frequency, hematuria and urgency.   Musculoskeletal: Positive  for gait problem. Negative for arthralgias, back pain, joint swelling, myalgias, neck pain and neck stiffness.   Skin: Negative for rash and wound.   Allergic/Immunologic: Negative for environmental allergies and food allergies.   Neurological: Negative for dizziness, tremors, seizures, syncope, speech difficulty, weakness, light-headedness, numbness and headaches.   Hematological: Negative for adenopathy. Does not bruise/bleed easily.   Psychiatric/Behavioral: Positive for decreased concentration and sleep disturbance. Negative for agitation, confusion, hallucinations and suicidal ideas. The patient is not nervous/anxious.    All other systems reviewed and are negative.       Objective:  /76   Pulse 79   Wt 84.8 kg (187 lb)   SpO2 98%   BMI 35.33 kg/m²     Neurologic Exam  Mental Status   Oriented to person, place, and time.   Registration: recalls 3 of 3 objects. Recall at 5 minutes: recalls 3 of 3 objects.   Speech: speech is normal   Level of consciousness: alert     Cranial Nerves   Cranial nerves II through XII intact.      Motor Exam   Muscle bulk: normal  Overall muscle tone: normal     Strength   Strength 5/5 except as noted.      Gait, Coordination, and Reflexes      Gait  Gait: wide-based (antalgic and using rolling walker)     Coordination   Finger to nose coordination: normal     Tremor   Resting tremor: absent  Intention tremor: present (mild BUE kinetic hand tremor)  Action tremor: absent      Physical Exam  Constitutional:       Appearance: Normal appearance.   Neurological:      Mental Status: She is alert and oriented to person, place, and time.      Coordination: Finger-Nose-Finger Test normal.   Psychiatric:         Mood and Affect: Mood is anxious.         Speech: Speech normal.         Behavior: Behavior normal.         Thought Content: Thought content normal.         Cognition and Memory: Cognition and memory normal.         Judgment: Judgment normal.      MMSE 28/30 delayed recall  3/3 7/2021  MMSE 29/30, DELAYED RECALL 2/3 12/15/2021    Assessment/Plan:       Diagnoses and all orders for this visit:    1. Cerebral microvasculopathy (Primary)  Comments:  continue statin and eliquis    2. Vitamin B12 deficiency  -     Vitamin B12; Future  -     Methylmalonic Acid, Serum; Future    3. Mild cognitive impairment  Comments:  continue to monitor, MMSE 29/30    4. Episodes of staring  Comments:  EEG normal, no more spells, monitor    5. Frequent falls  Comments:  continue walker           Continue to monitor her cognition.  EEG was normal.  No additional reports of staring spells noted.  Continue on statin and Eliquis.  Continue close monitoring with PCP and all specialist.  Recommend rescheduling sleep study to evaluate for sleep apnea.  Follow-up in 6 months or sooner if needed.  Suspect mild cognitive impairment or very minimal vascular type dementia.  Could consider cognitive enhancer in the future.  Her cognitive testing has been stable overall.  Reviewed medications, potential side effects and signs and symptoms to report. Discussed risk versus benefits of treatment plan with patient and/or family-including medications, labs and radiology that may be ordered. Addressed questions and concerns during visit. Patient and/or family verbalized understanding and agree with plan.    AS THE PROVIDER, I PERSONALLY WORE PPE DURING ENTIRE FACE TO FACE ENCOUNTER IN CLINIC WITH THE PATIENT. PATIENT ALSO WORE PPE DURING ENTIRE FACE TO FACE ENCOUNTER EXCEPT FOR A MAX OF 30 SECONDS DURING NEUROLOGICAL EVALUATION OF CRANIAL NERVES AND THEN MASK WAS PLACED BACK OVER PATIENT FACE FOR REMAINDER OF VISIT. I WASHED MY HANDS BEFORE AND AFTER VISIT.    During this visit the following were done:  Labs Reviewed [x]    Labs Ordered [x]    Radiology Reports Reviewed []    Radiology Ordered []    PCP Records Reviewed []    Referring Provider Records Reviewed []    ER Records Reviewed []    Hospital Records Reviewed []     History Obtained From Family [x] additional history obtained from caregiver accompanying patient to today's visit.  Radiology Images Reviewed []    Other Reviewed [x] [facility] notes and [facility] notes reviewed today.  Records Requested []      Shirley Billingsley, APRN  12/15/2021

## 2021-12-15 NOTE — PATIENT INSTRUCTIONS
Recheck vitamin b12 level today.    Continue to monitor memory-still doing well on testing.    Follow up in 6 months.    Can call sleep clinic with [facility] to reschedule once able.

## 2021-12-16 ENCOUNTER — TELEPHONE (OUTPATIENT)
Dept: NEUROLOGY | Facility: CLINIC | Age: 81
End: 2021-12-16

## 2021-12-16 NOTE — TELEPHONE ENCOUNTER
----- Message from LACY Carver sent at 12/16/2021 10:29 AM EST -----  Please notify Ms. Conway and her daughter that her vitamin B12 recheck was 546 and she has significant improvement in her levels.  I know that she completed the B12 injections.  I would recommend she take vitamin B12 500 MCG daily over-the-counter.  This will help maintain a normal level of vitamin B12.  We will follow-up as scheduled.  Thanks, LACY Zuleta.    Fax copy to PCP

## 2021-12-16 NOTE — PROGRESS NOTES
Please notify Ms. Conway and her daughter that her vitamin B12 recheck was 546 and she has significant improvement in her levels.  I know that she completed the B12 injections.  I would recommend she take vitamin B12 500 MCG daily over-the-counter.  This will help maintain a normal level of vitamin B12.  We will follow-up as scheduled.  Thanks, LACY Zuleta.    Fax copy to PCP

## 2021-12-22 ENCOUNTER — TELEPHONE (OUTPATIENT)
Dept: NEUROLOGY | Facility: CLINIC | Age: 81
End: 2021-12-22

## 2021-12-22 LAB
Lab: ABNORMAL
METHYLMALONATE SERPL-SCNC: 443 NMOL/L (ref 0–378)

## 2021-12-22 NOTE — TELEPHONE ENCOUNTER
----- Message from LACY Carver sent at 12/22/2021  7:44 AM EST -----  Please notify the patient's daughter that the vitamin B12 level did return within normal range at 546, however the additional lab methylmalonic acid which we previously checked 7 months ago has improved to 443 but is still slightly elevated.  I would recommend that she continue to take vitamin B12 500 mcg daily over-the-counter.  We can recheck this level again in 6 months.  I do not think at this point that she needs additional B12 injections.  Please fax a copy of labs to PCP.  Thanks, LACY Zuleta.

## 2021-12-22 NOTE — PROGRESS NOTES
Please notify the patient's daughter that the vitamin B12 level did return within normal range at 546, however the additional lab methylmalonic acid which we previously checked 7 months ago has improved to 443 but is still slightly elevated.  I would recommend that she continue to take vitamin B12 500 mcg daily over-the-counter.  We can recheck this level again in 6 months.  I do not think at this point that she needs additional B12 injections.  Please fax a copy of labs to PCP.  Thanks, LACY Zuleta.

## 2022-03-28 ENCOUNTER — APPOINTMENT (OUTPATIENT)
Dept: BONE DENSITY | Facility: HOSPITAL | Age: 82
End: 2022-03-28

## 2022-05-27 ENCOUNTER — OFFICE VISIT (OUTPATIENT)
Dept: ONCOLOGY | Facility: CLINIC | Age: 82
End: 2022-05-27

## 2022-05-27 VITALS
TEMPERATURE: 97.3 F | HEIGHT: 61 IN | HEART RATE: 75 BPM | BODY MASS INDEX: 36.65 KG/M2 | WEIGHT: 194.1 LBS | SYSTOLIC BLOOD PRESSURE: 116 MMHG | RESPIRATION RATE: 17 BRPM | OXYGEN SATURATION: 96 % | DIASTOLIC BLOOD PRESSURE: 84 MMHG

## 2022-05-27 DIAGNOSIS — Z17.0 MALIGNANT NEOPLASM OF UPPER-OUTER QUADRANT OF RIGHT BREAST IN FEMALE, ESTROGEN RECEPTOR POSITIVE: Primary | ICD-10-CM

## 2022-05-27 DIAGNOSIS — C50.411 MALIGNANT NEOPLASM OF UPPER-OUTER QUADRANT OF RIGHT BREAST IN FEMALE, ESTROGEN RECEPTOR POSITIVE: Primary | ICD-10-CM

## 2022-05-27 PROCEDURE — 99214 OFFICE O/P EST MOD 30 MIN: CPT | Performed by: INTERNAL MEDICINE

## 2022-05-27 RX ORDER — ATORVASTATIN CALCIUM 80 MG/1
1 TABLET, FILM COATED ORAL
COMMUNITY
Start: 2022-05-09

## 2022-05-27 RX ORDER — GLUCOSAM/CHON-MSM1/C/MANG/BOSW 500-416.6
TABLET ORAL
COMMUNITY
Start: 2022-05-12

## 2022-05-27 NOTE — PROGRESS NOTES
Follow Up Office Visit      Date: 2022     Patient Name: Urvashi Barrera  MRN: [id number]  : 1940  Referring Physician: Jean Claude Alvarado     Chief Complaint:  Follow-up for right breast cancer, ER/SD positive, HER-2/alek negative     History of Present Illness: Urvashi Barrera is a pleasant 81 y.o. female past medical history of right-sided breast cancer status post right mastectomy and partial mastectomy of the left breast, currently receiving endocrine therapy, CAD status post MI in , CHF, hyperlipidemia, hypertension, type 2 diabetes, GERD who presents today for evaluation of right breast cancer. The patient is accompanied by their daughter who contributes to the history of their care.  Patient was diagnosed with right breast invasive ductal carcinoma in 2012.  She underwent a right mastectomy and a partial left mastectomy.  Pathology was consistent with a T2 invasive ductal carcinoma, ER/SD positive, HER-2/alek negative.  0 lymph nodes were positive for disease.  Surgical margins were negative.  There was a component of LCIS in the left breast but no invasive disease.  She was started on exemestane with plans to treat for about 10 years.  Her oncologic course has been complicated by chest wall discomfort secondary to neuropathy which is improved with gabapentin along with osteopenia/porosis diagnosed on DEXA scan in 2019.  She states she has not been on calcium or vitamin D.  She has significant left sciatic pain secondary to lumbar stenosis.  She is following with orthopedic/pain for management of this.  She otherwise tolerates her exemestane well and denies any significant arthralgias, hot flashes, fatigue    Interval History:  Presents clinic for follow-up.  Currently on exemestane and tolerating well.  Scheduled to finish her 10 years of treatment this month.  Remains compliant with her calcium/vitamin D.  Denies any breast pain or discomfort.  Continues to have some issues with her  eyes which is affected her balance    Oncology History:    Oncology/Hematology History    No history exists.       Subjective      Review of Systems:   Constitutional: Negative for fevers, chills, or weight loss  Eyes: Negative for blurred vision or discharge         Ear/Nose/Throat: Negative for difficulty swallowing, sore throat, LAD                                                       Respiratory: Negative for cough, SOA, wheezing                                                                                        Cardiovascular: Negative for chest pain or palpitations                                                                  Gastrointestinal: Negative for nausea, vomiting or diarrhea                                                                     Genitourinary: Negative for dysuria or hematuria                                                                                           Musculoskeletal: Negative for any joint pains or muscle aches                                                                        Neurologic: Negative for any weakness, headaches, dizziness                                                                         Hematologic: Negative for any easy bleeding or bruising                                                                                   Psychiatric: Negative for anxiety or depression                          Past Medical History/Past Surgical History/ Family History/ Social History: Reviewed by me and unchanged from my previous documentation done on November 2021.     Medications:     Current Outpatient Medications:   •  Accu-Chek Brit Plus test strip, , Disp: , Rfl:   •  atorvastatin (LIPITOR) 80 MG tablet, , Disp: , Rfl:   •  atorvastatin (LIPITOR) 80 MG tablet, Take 1 tablet by mouth every night at bedtime., Disp: , Rfl:   •  buPROPion XL (WELLBUTRIN XL) 150 MG 24 hr tablet, , Disp: , Rfl:   •  Calcium Carbonate-Vit D-Min (Calcium-Vitamin D-Minerals)  "600-800 MG-UNIT chewable tablet, Chew 1 tablet Daily., Disp: 90 tablet, Rfl: 3  •  diphenhydrAMINE (BENADRYL) 25 mg capsule, Take 25 mg by mouth Every 6 (Six) Hours As Needed for Itching., Disp: , Rfl:   •  Eliquis 5 MG tablet tablet, , Disp: , Rfl:   •  Entresto 24-26 MG tablet, , Disp: , Rfl:   •  gabapentin (NEURONTIN) 100 MG capsule, , Disp: , Rfl:   •  Jardiance 10 MG tablet, , Disp: , Rfl:   •  methocarbamol (ROBAXIN) 500 MG tablet, , Disp: , Rfl:   •  metoprolol succinate XL (TOPROL-XL) 100 MG 24 hr tablet, , Disp: , Rfl:   •  pantoprazole (PROTONIX) 40 MG EC tablet, , Disp: , Rfl:   •  Syringe/Needle, Disp, 25G X 1-1/4\" 3 ML misc, Use for vitamin b12 injections, Disp: 10 each, Rfl: 0  •  Toujeo Max SoloStar 300 UNIT/ML solution pen-injector injection, , Disp: , Rfl:   •  TRUEplus Lancets 33G misc, , Disp: , Rfl:   •  exemestane (AROMASIN) 25 MG chemo tablet, Take 1 tablet by mouth Daily., Disp: 90 tablet, Rfl: 1    Allergies:   Allergies   Allergen Reactions   • Dust Mite Extract Hives   • Poison Ivy Extract Hives   • Pollen Extract Hives       Objective     Physical Exam:  Vital Signs:   Vitals:    05/27/22 1508   BP: 116/84   Pulse: 75   Resp: 17   Temp: 97.3 °F (36.3 °C)   TempSrc: Temporal   SpO2: 96%   Weight: 88 kg (194 lb 1.6 oz)   Height: 154.9 cm (61\")   PainSc: 0-No pain     Pain Score    05/27/22 1508   PainSc: 0-No pain     ECOG Performance Status: 1    Constitutional: NAD, ECOG 1  Eyes: PERRLA, scleral anicteric  ENT: No LAD, no thyromegaly  Respiratory: CTAB, no wheezing, rales, rhonchi  Cardiovascular: RRR, no murmurs, pulses 2+ bilaterally  Abdomen: soft, NT/ND, no HSM  Musculoskeletal: strength 5/5 bilaterally, no c/c/e  Neurologic: A&O x 3, CN II-XII intact grossly    Results Review:   No visits with results within 2 Week(s) from this visit.   Latest known visit with results is:   Lab on 12/15/2021   Component Date Value Ref Range Status   • Vitamin B-12 12/15/2021 546  211 - 946 pg/mL " Final    Results may be falsely increased if patient taking Biotin.   • Methylmalonic Acid 12/15/2021 443 (A) 0 - 378 nmol/L Final   • Disclaimer: 12/15/2021 Comment   Final    Comment: This test was developed and its performance characteristics  determined by [facility]. It has not been cleared or approved  by the Food and Drug Administration.         CT Head Without Contrast    Result Date: 4/29/2022  Narrative: Exam/Procedure: CT HEAD WO IV CONTRAST ordered by ESTEPHANIA KELLY, [id number] CLINICAL INDICATION: Fall on Monday, posterior head pain TECHNIQUE: Routine contiguous axial CT images of the head were obtained without contrast administration. Total DLP (Dose-Length Product): 676.69 mGy.cm. Please note: The reported value represents the total of one or more individual components during the CT acquisition on this date and at this time, and as such, the same value may appear in more than one CT report depending on the interpreting/reporting physicians. COMPARISON: CT head without contrast 7/8/2021 FINDINGS: No acute intracranial abnormality. There is a large amount of white matter hypodensities which are likely to chronic small vessel ischemic changes. Intracranial vascular calcifications. No evidence of acute hemorrhage or ischemia. No mass, mass effect, or midline displacement of structures. Normal ventricular size and configuration for age. Patent basal cisterns. No displaced or depressed calvarial fractures. The visualized paranasal sinuses and mastoid air cells are clear.     Impression: No acute intracranial abnormality. CRITICAL RESULT:   No. COMMUNICATION: Per this written report. Approved by Aris Last M.D. on 4/29/2022 2:06 AM By electronically signing this report, I, the attending physician, attest that I have personally reviewed the images/data for the above examination(s) and agree with the final edited report. Dictated by Aris Last M.D. on 4/29/2022 2:06 AM Signed by Miky Smith on 4/29/2022  2:20 AM      Assessment / Plan      Assessment/Plan:   1. Malignant neoplasm of upper-outer quadrant of right breast in female, estrogen receptor positive (HCC) (Primary)  -Initially diagnosed in 2012  -Status post right mastectomy and partial left mastectomy  -Pathology from the right breast consistent with invasive ductal carcinoma, ER/PA positive, HER-2/alek negative, 0 lymph nodes positive for disease  -Pathology from left breast showing a component of LCIS but no invasive disease  -Pathologic stage IA (T2,N0,M0)  -Was started on exemestane by her primary oncologist in [place].  Plan was to complete 10 years of treatment which she will finish in April/May 2022  -Previous DEXA scan in 2019 concerning for osteopenia/porosis.    -Last mammogram was in July 2020.  Repeat mammogram scheduled for July 2022  -Patient did not have a DEXA scan completed in March 2022, rescheduled today  -Okay for patient to discontinue exemestane.  Advised patient to continue calcium/vitamin D given her history of osteopenia/porosis  -Patient is now 10 years out from her cancer diagnosis and is finishing her endocrine therapy.  She has elected to have further mammograms ordered through her primary care doctor  That she can follow-up in the clinic as needed     2.  History of CAD and CHF  -Status post MI in 2020  -Currently on Eliquis, atorvastatin, Entresto     3.  Left sciatic leg pain  -Secondary to lumbar stenosis  -Follow with orthopedic/pain    4.  Type 2 diabetes  -Complicates all aspects of care  -Follow with her PCP    Follow Up:   Follow-up as needed or in 1 year should she have any concerning symptoms     Dmitry Dupree MD  Hematology and Oncology     Please note that portions of this note may have been completed with a voice recognition program. Efforts were made to edit the dictations, but occasionally words are mistranscribed.

## 2022-06-09 RX ORDER — EXEMESTANE 25 MG/1
TABLET ORAL
Qty: 90 TABLET | Refills: 0 | Status: SHIPPED | OUTPATIENT
Start: 2022-06-09 | End: 2022-10-31

## 2022-08-18 ENCOUNTER — HOSPITAL ENCOUNTER (OUTPATIENT)
Dept: BONE DENSITY | Facility: HOSPITAL | Age: 82
End: 2022-08-18

## 2022-10-31 RX ORDER — EXEMESTANE 25 MG/1
TABLET ORAL
Qty: 90 TABLET | Refills: 0 | Status: SHIPPED | OUTPATIENT
Start: 2022-10-31

## 2025-03-26 ENCOUNTER — TRANSCRIBE ORDERS (OUTPATIENT)
Dept: DIABETES SERVICES | Facility: HOSPITAL | Age: 85
End: 2025-03-26
Payer: MEDICARE

## 2025-03-26 DIAGNOSIS — E11.65 INADEQUATELY CONTROLLED DIABETES MELLITUS: Primary | ICD-10-CM
